# Patient Record
Sex: MALE | Race: WHITE | NOT HISPANIC OR LATINO | Employment: UNEMPLOYED | URBAN - METROPOLITAN AREA
[De-identification: names, ages, dates, MRNs, and addresses within clinical notes are randomized per-mention and may not be internally consistent; named-entity substitution may affect disease eponyms.]

---

## 2019-07-29 ENCOUNTER — HOSPITAL ENCOUNTER (EMERGENCY)
Facility: HOSPITAL | Age: 54
Discharge: HOME | End: 2019-07-29
Attending: EMERGENCY MEDICINE | Admitting: EMERGENCY MEDICINE
Payer: COMMERCIAL

## 2019-07-29 VITALS
DIASTOLIC BLOOD PRESSURE: 93 MMHG | SYSTOLIC BLOOD PRESSURE: 153 MMHG | RESPIRATION RATE: 20 BRPM | TEMPERATURE: 97.7 F | WEIGHT: 193.34 LBS | OXYGEN SATURATION: 97 % | HEART RATE: 110 BPM

## 2019-07-29 DIAGNOSIS — S01.81XA FACIAL LACERATION, INITIAL ENCOUNTER: Primary | ICD-10-CM

## 2019-07-29 DIAGNOSIS — S40.012A TRAUMATIC HEMATOMA OF LEFT SHOULDER, INITIAL ENCOUNTER: ICD-10-CM

## 2019-07-29 PROBLEM — J44.1 COPD WITH EXACERBATION (HCC): Status: ACTIVE | Noted: 2017-10-30

## 2019-07-29 PROBLEM — M54.41 CHRONIC BILATERAL LOW BACK PAIN WITH BILATERAL SCIATICA: Status: ACTIVE | Noted: 2017-03-27

## 2019-07-29 PROBLEM — G89.29 CHRONIC BILATERAL LOW BACK PAIN WITH BILATERAL SCIATICA: Status: ACTIVE | Noted: 2017-03-27

## 2019-07-29 PROBLEM — M47.812 CERVICAL SPONDYLOSIS WITHOUT MYELOPATHY: Status: ACTIVE | Noted: 2017-12-22

## 2019-07-29 PROBLEM — M54.42 CHRONIC BILATERAL LOW BACK PAIN WITH BILATERAL SCIATICA: Status: ACTIVE | Noted: 2017-03-27

## 2019-07-29 PROBLEM — F32.0 CURRENT MILD EPISODE OF MAJOR DEPRESSIVE DISORDER (HCC): Status: ACTIVE | Noted: 2019-03-18

## 2019-07-29 PROCEDURE — 99283 EMERGENCY DEPT VISIT LOW MDM: CPT

## 2019-07-29 PROCEDURE — 99283 EMERGENCY DEPT VISIT LOW MDM: CPT | Performed by: EMERGENCY MEDICINE

## 2019-07-30 NOTE — ED ATTENDING ATTESTATION
IEric 24, DO, saw and evaluated the patient  I have discussed the patient with the resident/non-physician practitioner and agree with the resident's/non-physician practitioner's findings, Plan of Care, and MDM as documented in the resident's/non-physician practitioner's note, except where noted  All available labs and Radiology studies were reviewed  I was present for key portions of any procedure(s) performed by the resident/non-physician practitioner and I was immediately available to provide assistance  At this point I agree with the current assessment done in the Emergency Department  I have conducted an independent evaluation of this patient a history and physical is as follows:    48 y o  M p/w facial lacerations x last night  Got shoe lace caught in chair and caused him to fall  Hit face  +LOC  Didn't come in earlier today because it was too hot out  Pt walked here 8 blocks from home  Denies HA, changes in vision, neck pain, N/V, focal weakness  EOM-I  PERRL  Has a healing lac to left upper eye and left cheek  Bleeding controlled  Contusion to left shoulder but with full ROM  No C/T/L-spine tenderness  Neuro intact  Up to date with Tetanus  Pt came in to see if he needs sutures  Lacerations are not gaping and are starting to scab over  Plan: Facial injury - Will apply steri strips        Critical Care Time  Procedures

## 2019-07-30 NOTE — ED PROVIDER NOTES
History  Chief Complaint   Patient presents with    Head Injury w/LOC     Pt reports tripped and fell on shoe string around 0100 this AM, reports LOC for approximately 30 seconds, no thinners, abrasion noted to L side of face, reports alochol use last night, but denies today, denies vision changes,     29-year-old male presenting emergency department for evaluation of 2 lacerations around his left orbit present for the last day  Reports that around 1:00 a m  This morning he was getting up from an office chair and shoe lace was wrapped in the wheel causing him to trip and fall forward  He hit his left side of his face against another chair next to an  Admits to losing consciousness  Says that after he woke back up he was able to clean and dress the wounds  He has been applying ice during the day  He would come in earlier but he was responsible for taking care of his elderly mother as well as reluctant to walk to the hospital in the heat today so he was waiting for the sun to go down  He also notes that he is left shoulder but denies any difficulty with strength or range of motion left shoulder  Denies any double vision, blurry vision, headaches, or other injuries  No pain with extraocular movements  Tetanus is up-to-date within the last 5 years  None       Past Medical History:   Diagnosis Date    Asthma     COPD (chronic obstructive pulmonary disease) ([facility] [address] )        History reviewed  No pertinent surgical history  History reviewed  No pertinent family history  I have reviewed and agree with the history as documented  Social History     Tobacco Use    Smoking status: Current Every Day Smoker     Packs/day: 0 50     Types: Cigarettes    Smokeless tobacco: Never Used   Substance Use Topics    Alcohol use: Yes    Drug use: Not Currently        Review of Systems   Constitutional: Negative  Negative for chills, fatigue and fever     HENT: Negative for congestion, dental problem, facial swelling, nosebleeds, sore throat, trouble swallowing and voice change  Eyes: Negative for visual disturbance  Respiratory: Negative for cough, chest tightness and shortness of breath  Cardiovascular: Negative for chest pain and palpitations  Gastrointestinal: Negative for abdominal pain, diarrhea, nausea and vomiting  Genitourinary: Negative  Negative for difficulty urinating and dysuria  Musculoskeletal: Negative for arthralgias, back pain, gait problem, joint swelling, myalgias, neck pain and neck stiffness  Skin: Positive for wound  Negative for pallor and rash  Neurological: Negative for weakness, light-headedness, numbness and headaches  Physical Exam  ED Triage Vitals [07/29/19 2046]   Temperature Pulse Respirations Blood Pressure SpO2   97 7 °F (36 5 °C) (!) 110 20 153/93 97 %      Temp Source Heart Rate Source Patient Position - Orthostatic VS BP Location FiO2 (%)   Oral Monitor Sitting Right arm --      Pain Score       6             Orthostatic Vital Signs  Vitals:    07/29/19 2046   BP: 153/93   Pulse: (!) 110   Patient Position - Orthostatic VS: Sitting       Physical Exam   Constitutional: He is oriented to person, place, and time  He appears well-developed and well-nourished  HENT:   Head: Normocephalic  Right Ear: External ear normal    Left Ear: External ear normal    Nose: Nose normal    Mouth/Throat: Oropharynx is clear and moist    Two irregularly shaped lacerations around the left orbit  Eyes: Pupils are equal, round, and reactive to light  EOM are normal    Neck: No tracheal deviation present  No neck tenderness   Cardiovascular: Normal rate, regular rhythm, normal heart sounds and intact distal pulses  Pulmonary/Chest: Effort normal and breath sounds normal  No respiratory distress  He exhibits no tenderness  Abdominal: Soft  He exhibits no distension  There is no tenderness  Musculoskeletal: Normal range of motion   He exhibits no edema, tenderness or deformity  Arms:  No midline spinal tenderness  Stable pelvis  No pain with AP or lateral compression of the chest wall  No bony tenderness to the upper or lower extremities  Neurological: He is alert and oriented to person, place, and time  No cranial nerve deficit or sensory deficit  He exhibits normal muscle tone  Coordination normal    Skin: Skin is warm and dry  Capillary refill takes less than 2 seconds  No pallor  Nursing note and vitals reviewed  ED Medications  Medications - No data to display    Diagnostic Studies  Results Reviewed     None                 No orders to display         Procedures  Procedures        ED Course                               MDM  Number of Diagnoses or Management Options  Diagnosis management comments: 81-year-old male presenting emergency department for evaluation to the left side of the face after falling 1 day ago  Denies any complaints  No signs of orbital wall fracture with entrapment  No signs of intracranial head injury  Patient's tetanus is up-to-date  I discussed the risks of suturing this long after the wound  Patient agreed to treat alternatively with loose approximation with Steri-Strips  Instructed on local wound care  Referred to primary care physician for further management if he has any concerns  Disposition  Final diagnoses:   Facial laceration, initial encounter   Traumatic hematoma of left shoulder, initial encounter     Time reflects when diagnosis was documented in both MDM as applicable and the Disposition within this note     Time User Action Codes Description Comment    7/29/2019 10:24 PM Sana Brito Add [S01 81XA] Facial laceration, initial encounter     7/29/2019 10:24 PM Bhupendra Kent Add [S40 012A] Traumatic hematoma of left shoulder, initial encounter       ED Disposition     ED Disposition Condition Date/Time Comment    Discharge Stable Mon Jul 29, 2019 10:24 PM Hipolito Villa discharge to home/self care  Follow-up Information     Follow up With Specialties Details Why Contact Info Additional [address] [facility]  [phone]  [address] [place] [address],[address], [id number]          Patient's Medications    No medications on file     No discharge procedures on file  ED Provider  Attending physically available and evaluated Napoleon Myers I managed the patient along with the ED Attending      Electronically Signed by         Zohreh Carey MD  07/29/19 7827

## 2025-02-21 ENCOUNTER — APPOINTMENT (EMERGENCY)
Dept: CT IMAGING | Facility: HOSPITAL | Age: 60
DRG: 720 | End: 2025-02-21
Payer: MEDICARE

## 2025-02-21 ENCOUNTER — HOSPITAL ENCOUNTER (INPATIENT)
Facility: HOSPITAL | Age: 60
LOS: 5 days | Discharge: HOME | DRG: 720 | End: 2025-02-26
Attending: EMERGENCY MEDICINE | Admitting: INTERNAL MEDICINE
Payer: MEDICARE

## 2025-02-21 ENCOUNTER — APPOINTMENT (EMERGENCY)
Dept: RADIOLOGY | Facility: HOSPITAL | Age: 60
DRG: 720 | End: 2025-02-21
Payer: MEDICARE

## 2025-02-21 DIAGNOSIS — N17.9 ACUTE KIDNEY INJURY (HCC): ICD-10-CM

## 2025-02-21 DIAGNOSIS — J96.00 ACUTE RESPIRATORY FAILURE (HCC): Primary | ICD-10-CM

## 2025-02-21 DIAGNOSIS — J10.1 INFLUENZA A: ICD-10-CM

## 2025-02-21 DIAGNOSIS — J18.9 RLL PNEUMONIA: ICD-10-CM

## 2025-02-21 DIAGNOSIS — I10 BENIGN ESSENTIAL HYPERTENSION: ICD-10-CM

## 2025-02-21 DIAGNOSIS — Z13.9 ENCOUNTER FOR SCREENING INVOLVING SOCIAL DETERMINANTS OF HEALTH (SDOH): ICD-10-CM

## 2025-02-21 DIAGNOSIS — J44.1 COPD EXACERBATION (HCC): ICD-10-CM

## 2025-02-21 PROBLEM — A41.9 SEPSIS WITH ACUTE ORGAN DYSFUNCTION (HCC): Status: ACTIVE | Noted: 2025-02-21

## 2025-02-21 PROBLEM — R65.20 SEPSIS WITH ACUTE ORGAN DYSFUNCTION (HCC): Status: ACTIVE | Noted: 2025-02-21

## 2025-02-21 PROBLEM — E87.1 HYPONATREMIA: Status: ACTIVE | Noted: 2025-02-21

## 2025-02-21 PROBLEM — E87.8 DISORDER OF ACID-BASE BALANCE: Status: ACTIVE | Noted: 2025-02-21

## 2025-02-21 PROBLEM — J44.9 COPD (CHRONIC OBSTRUCTIVE PULMONARY DISEASE) (HCC): Status: ACTIVE | Noted: 2017-10-30

## 2025-02-21 PROBLEM — E83.52 HYPERCALCEMIA: Status: ACTIVE | Noted: 2025-02-21

## 2025-02-21 LAB
2HR DELTA HS TROPONIN: 2 NG/L
4HR DELTA HS TROPONIN: 2 NG/L
ALBUMIN SERPL BCG-MCNC: 2.1 G/DL (ref 3.5–5)
ALP SERPL-CCNC: 22 U/L (ref 34–104)
ALT SERPL W P-5'-P-CCNC: 45 U/L (ref 7–52)
AMORPH URATE CRY URNS QL MICRO: ABNORMAL
ANION GAP SERPL CALCULATED.3IONS-SCNC: 17 MMOL/L (ref 4–13)
ANION GAP SERPL CALCULATED.3IONS-SCNC: 20 MMOL/L (ref 4–13)
ANION GAP SERPL CALCULATED.3IONS-SCNC: 24 MMOL/L (ref 4–13)
APTT PPP: 30 SECONDS (ref 23–34)
AST SERPL W P-5'-P-CCNC: 38 U/L (ref 13–39)
ATRIAL RATE: 112 BPM
ATRIAL RATE: 115 BPM
ATRIAL RATE: 133 BPM
BACTERIA UR QL AUTO: ABNORMAL /HPF
BASE EX.OXY STD BLDV CALC-SCNC: 88.8 % (ref 60–80)
BASE EXCESS BLDV CALC-SCNC: -12.9 MMOL/L
BASOPHILS # BLD AUTO: 0.07 THOUSANDS/ΜL (ref 0–0.1)
BASOPHILS NFR BLD AUTO: 1 % (ref 0–1)
BILIRUB SERPL-MCNC: 0.74 MG/DL (ref 0.2–1)
BILIRUB UR QL STRIP: NEGATIVE
BNP SERPL-MCNC: 65 PG/ML (ref 0–100)
BUN SERPL-MCNC: 107 MG/DL (ref 5–25)
BUN SERPL-MCNC: 117 MG/DL (ref 5–25)
BUN SERPL-MCNC: 122 MG/DL (ref 5–25)
CALCIUM ALBUM COR SERPL-MCNC: 11.5 MG/DL (ref 8.3–10.1)
CALCIUM SERPL-MCNC: 10 MG/DL (ref 8.4–10.2)
CALCIUM SERPL-MCNC: 9 MG/DL (ref 8.4–10.2)
CALCIUM SERPL-MCNC: 9.1 MG/DL (ref 8.4–10.2)
CARDIAC TROPONIN I PNL SERPL HS: 7 NG/L (ref ?–50)
CARDIAC TROPONIN I PNL SERPL HS: 9 NG/L (ref ?–50)
CARDIAC TROPONIN I PNL SERPL HS: 9 NG/L (ref ?–50)
CHLORIDE SERPL-SCNC: 90 MMOL/L (ref 96–108)
CHLORIDE SERPL-SCNC: 95 MMOL/L (ref 96–108)
CHLORIDE SERPL-SCNC: 95 MMOL/L (ref 96–108)
CK SERPL-CCNC: 242 U/L (ref 39–308)
CLARITY UR: ABNORMAL
CO2 SERPL-SCNC: 13 MMOL/L (ref 21–32)
CO2 SERPL-SCNC: 15 MMOL/L (ref 21–32)
CO2 SERPL-SCNC: 20 MMOL/L (ref 21–32)
COLOR UR: YELLOW
CREAT SERPL-MCNC: 4.03 MG/DL (ref 0.6–1.3)
CREAT SERPL-MCNC: 5.41 MG/DL (ref 0.6–1.3)
CREAT SERPL-MCNC: 7 MG/DL (ref 0.6–1.3)
CREAT UR-MCNC: 212.9 MG/DL
EOSINOPHIL # BLD AUTO: 0.01 THOUSAND/ΜL (ref 0–0.61)
EOSINOPHIL NFR BLD AUTO: 0 % (ref 0–6)
ERYTHROCYTE [DISTWIDTH] IN BLOOD BY AUTOMATED COUNT: 12.9 % (ref 11.6–15.1)
FLUAV RNA RESP QL NAA+PROBE: POSITIVE
FLUBV RNA RESP QL NAA+PROBE: NEGATIVE
GFR SERPL CREATININE-BSD FRML MDRD: 10 ML/MIN/1.73SQ M
GFR SERPL CREATININE-BSD FRML MDRD: 15 ML/MIN/1.73SQ M
GFR SERPL CREATININE-BSD FRML MDRD: 7 ML/MIN/1.73SQ M
GLUCOSE SERPL-MCNC: 147 MG/DL (ref 65–140)
GLUCOSE SERPL-MCNC: 181 MG/DL (ref 65–140)
GLUCOSE SERPL-MCNC: 186 MG/DL (ref 65–140)
GLUCOSE UR STRIP-MCNC: NEGATIVE MG/DL
HCO3 BLDV-SCNC: 14.6 MMOL/L (ref 24–30)
HCT VFR BLD AUTO: 41.1 % (ref 36.5–49.3)
HGB BLD-MCNC: 14.7 G/DL (ref 12–17)
HGB UR QL STRIP.AUTO: ABNORMAL
HYALINE CASTS #/AREA URNS LPF: ABNORMAL /LPF
IMM GRANULOCYTES # BLD AUTO: 0.18 THOUSAND/UL (ref 0–0.2)
IMM GRANULOCYTES NFR BLD AUTO: 2 % (ref 0–2)
INR PPP: 1.26 (ref 0.85–1.19)
KETONES UR STRIP-MCNC: NEGATIVE MG/DL
L PNEUMO1 AG UR QL IA.RAPID: NEGATIVE
LACTATE SERPL-SCNC: 2 MMOL/L (ref 0.5–2)
LACTATE SERPL-SCNC: 2.6 MMOL/L (ref 0.5–2)
LEUKOCYTE ESTERASE UR QL STRIP: NEGATIVE
LYMPHOCYTES # BLD AUTO: 0.57 THOUSANDS/ΜL (ref 0.6–4.47)
LYMPHOCYTES NFR BLD AUTO: 7 % (ref 14–44)
MCH RBC QN AUTO: 31.6 PG (ref 26.8–34.3)
MCHC RBC AUTO-ENTMCNC: 35.8 G/DL (ref 31.4–37.4)
MCV RBC AUTO: 88 FL (ref 82–98)
MONOCYTES # BLD AUTO: 1.54 THOUSAND/ΜL (ref 0.17–1.22)
MONOCYTES NFR BLD AUTO: 18 % (ref 4–12)
NEUTROPHILS # BLD AUTO: 6.16 THOUSANDS/ΜL (ref 1.85–7.62)
NEUTS SEG NFR BLD AUTO: 72 % (ref 43–75)
NITRITE UR QL STRIP: NEGATIVE
NON-SQ EPI CELLS URNS QL MICRO: ABNORMAL /HPF
NRBC BLD AUTO-RTO: 0 /100 WBCS
O2 CT BLDV-SCNC: 18.9 ML/DL
P AXIS: 82 DEGREES
P AXIS: 84 DEGREES
P AXIS: 84 DEGREES
PCO2 BLDV: 39.1 MM HG (ref 42–50)
PH BLDV: 7.19 [PH] (ref 7.3–7.4)
PH UR STRIP.AUTO: 5 [PH]
PLATELET # BLD AUTO: 339 THOUSANDS/UL (ref 149–390)
PMV BLD AUTO: 9.1 FL (ref 8.9–12.7)
PO2 BLDV: 70.5 MM HG (ref 35–45)
POTASSIUM SERPL-SCNC: 3.2 MMOL/L (ref 3.5–5.3)
POTASSIUM SERPL-SCNC: 3.4 MMOL/L (ref 3.5–5.3)
POTASSIUM SERPL-SCNC: 3.7 MMOL/L (ref 3.5–5.3)
PR INTERVAL: 130 MS
PR INTERVAL: 132 MS
PROCALCITONIN SERPL-MCNC: 5.04 NG/ML
PROT SERPL-MCNC: 8.9 G/DL (ref 6.4–8.4)
PROT UR STRIP-MCNC: ABNORMAL MG/DL
PROT UR-MCNC: 200.1 MG/DL
PROT/CREAT UR: 0.9 MG/G{CREAT} (ref 0–0.1)
PROTHROMBIN TIME: 15.9 SECONDS (ref 12.3–15)
QRS AXIS: 84 DEGREES
QRS AXIS: 85 DEGREES
QRS AXIS: 88 DEGREES
QRSD INTERVAL: 86 MS
QRSD INTERVAL: 90 MS
QRSD INTERVAL: 92 MS
QT INTERVAL: 290 MS
QT INTERVAL: 322 MS
QT INTERVAL: 336 MS
QTC INTERVAL: 431 MS
QTC INTERVAL: 445 MS
QTC INTERVAL: 458 MS
RBC # BLD AUTO: 4.65 MILLION/UL (ref 3.88–5.62)
RBC #/AREA URNS AUTO: ABNORMAL /HPF
RSV RNA RESP QL NAA+PROBE: NEGATIVE
S PNEUM AG UR QL: POSITIVE
SARS-COV-2 RNA RESP QL NAA+PROBE: NEGATIVE
SODIUM SERPL-SCNC: 127 MMOL/L (ref 135–147)
SODIUM SERPL-SCNC: 130 MMOL/L (ref 135–147)
SODIUM SERPL-SCNC: 132 MMOL/L (ref 135–147)
SP GR UR STRIP.AUTO: 1.02 (ref 1–1.03)
T WAVE AXIS: 58 DEGREES
T WAVE AXIS: 59 DEGREES
T WAVE AXIS: 71 DEGREES
UROBILINOGEN UR STRIP-ACNC: <2 MG/DL
VENTRICULAR RATE: 112 BPM
VENTRICULAR RATE: 115 BPM
VENTRICULAR RATE: 133 BPM
WBC # BLD AUTO: 8.53 THOUSAND/UL (ref 4.31–10.16)
WBC #/AREA URNS AUTO: ABNORMAL /HPF

## 2025-02-21 PROCEDURE — 99285 EMERGENCY DEPT VISIT HI MDM: CPT

## 2025-02-21 PROCEDURE — 94760 N-INVAS EAR/PLS OXIMETRY 1: CPT

## 2025-02-21 PROCEDURE — 96365 THER/PROPH/DIAG IV INF INIT: CPT

## 2025-02-21 PROCEDURE — 99254 IP/OBS CNSLTJ NEW/EST MOD 60: CPT | Performed by: INTERNAL MEDICINE

## 2025-02-21 PROCEDURE — 96368 THER/DIAG CONCURRENT INF: CPT

## 2025-02-21 PROCEDURE — 94664 DEMO&/EVAL PT USE INHALER: CPT

## 2025-02-21 PROCEDURE — 82550 ASSAY OF CK (CPK): CPT

## 2025-02-21 PROCEDURE — 93010 ELECTROCARDIOGRAM REPORT: CPT | Performed by: INTERNAL MEDICINE

## 2025-02-21 PROCEDURE — 71250 CT THORAX DX C-: CPT

## 2025-02-21 PROCEDURE — 71045 X-RAY EXAM CHEST 1 VIEW: CPT

## 2025-02-21 PROCEDURE — 36415 COLL VENOUS BLD VENIPUNCTURE: CPT | Performed by: EMERGENCY MEDICINE

## 2025-02-21 PROCEDURE — 82570 ASSAY OF URINE CREATININE: CPT

## 2025-02-21 PROCEDURE — 94640 AIRWAY INHALATION TREATMENT: CPT

## 2025-02-21 PROCEDURE — 85730 THROMBOPLASTIN TIME PARTIAL: CPT

## 2025-02-21 PROCEDURE — 84484 ASSAY OF TROPONIN QUANT: CPT | Performed by: EMERGENCY MEDICINE

## 2025-02-21 PROCEDURE — 85610 PROTHROMBIN TIME: CPT

## 2025-02-21 PROCEDURE — 94002 VENT MGMT INPAT INIT DAY: CPT

## 2025-02-21 PROCEDURE — 0241U HB NFCT DS VIR RESP RNA 4 TRGT: CPT | Performed by: EMERGENCY MEDICINE

## 2025-02-21 PROCEDURE — 84156 ASSAY OF PROTEIN URINE: CPT

## 2025-02-21 PROCEDURE — 87040 BLOOD CULTURE FOR BACTERIA: CPT | Performed by: EMERGENCY MEDICINE

## 2025-02-21 PROCEDURE — 99223 1ST HOSP IP/OBS HIGH 75: CPT | Performed by: INTERNAL MEDICINE

## 2025-02-21 PROCEDURE — 87449 NOS EACH ORGANISM AG IA: CPT

## 2025-02-21 PROCEDURE — 74176 CT ABD & PELVIS W/O CONTRAST: CPT

## 2025-02-21 PROCEDURE — 94644 CONT INHLJ TX 1ST HOUR: CPT

## 2025-02-21 PROCEDURE — 80053 COMPREHEN METABOLIC PANEL: CPT | Performed by: EMERGENCY MEDICINE

## 2025-02-21 PROCEDURE — 80048 BASIC METABOLIC PNL TOTAL CA: CPT

## 2025-02-21 PROCEDURE — 83605 ASSAY OF LACTIC ACID: CPT | Performed by: EMERGENCY MEDICINE

## 2025-02-21 PROCEDURE — 93005 ELECTROCARDIOGRAM TRACING: CPT

## 2025-02-21 PROCEDURE — 96366 THER/PROPH/DIAG IV INF ADDON: CPT

## 2025-02-21 PROCEDURE — 83880 ASSAY OF NATRIURETIC PEPTIDE: CPT | Performed by: EMERGENCY MEDICINE

## 2025-02-21 PROCEDURE — 80048 BASIC METABOLIC PNL TOTAL CA: CPT | Performed by: NURSE PRACTITIONER

## 2025-02-21 PROCEDURE — 96367 TX/PROPH/DG ADDL SEQ IV INF: CPT

## 2025-02-21 PROCEDURE — 84145 PROCALCITONIN (PCT): CPT | Performed by: EMERGENCY MEDICINE

## 2025-02-21 PROCEDURE — 99291 CRITICAL CARE FIRST HOUR: CPT | Performed by: EMERGENCY MEDICINE

## 2025-02-21 PROCEDURE — 85025 COMPLETE CBC W/AUTO DIFF WBC: CPT | Performed by: EMERGENCY MEDICINE

## 2025-02-21 PROCEDURE — 81001 URINALYSIS AUTO W/SCOPE: CPT

## 2025-02-21 PROCEDURE — 82805 BLOOD GASES W/O2 SATURATION: CPT

## 2025-02-21 RX ORDER — NICOTINE 21 MG/24HR
14 PATCH, TRANSDERMAL 24 HOURS TRANSDERMAL EVERY 24 HOURS
Status: DISCONTINUED | OUTPATIENT
Start: 2025-02-21 | End: 2025-02-26 | Stop reason: HOSPADM

## 2025-02-21 RX ORDER — ARIPIPRAZOLE 10 MG/1
5 TABLET ORAL DAILY
Status: DISCONTINUED | OUTPATIENT
Start: 2025-02-22 | End: 2025-02-26 | Stop reason: HOSPADM

## 2025-02-21 RX ORDER — ALBUTEROL SULFATE 90 UG/1
2 INHALANT RESPIRATORY (INHALATION) EVERY 4 HOURS PRN
Status: DISCONTINUED | OUTPATIENT
Start: 2025-02-21 | End: 2025-02-26 | Stop reason: HOSPADM

## 2025-02-21 RX ORDER — SODIUM CHLORIDE, SODIUM GLUCONATE, SODIUM ACETATE, POTASSIUM CHLORIDE, MAGNESIUM CHLORIDE, SODIUM PHOSPHATE, DIBASIC, AND POTASSIUM PHOSPHATE .53; .5; .37; .037; .03; .012; .00082 G/100ML; G/100ML; G/100ML; G/100ML; G/100ML; G/100ML; G/100ML
2000 INJECTION, SOLUTION INTRAVENOUS ONCE
Status: COMPLETED | OUTPATIENT
Start: 2025-02-21 | End: 2025-02-21

## 2025-02-21 RX ORDER — IPRATROPIUM BROMIDE AND ALBUTEROL SULFATE 2.5; .5 MG/3ML; MG/3ML
3 SOLUTION RESPIRATORY (INHALATION)
Status: DISCONTINUED | OUTPATIENT
Start: 2025-02-21 | End: 2025-02-21

## 2025-02-21 RX ORDER — TIOTROPIUM BROMIDE INHALATION SPRAY 3.12 UG/1
2 SPRAY, METERED RESPIRATORY (INHALATION) DAILY
COMMUNITY
Start: 2025-01-06

## 2025-02-21 RX ORDER — POTASSIUM CHLORIDE 1500 MG/1
40 TABLET, EXTENDED RELEASE ORAL ONCE
Status: COMPLETED | OUTPATIENT
Start: 2025-02-21 | End: 2025-02-21

## 2025-02-21 RX ORDER — SODIUM CHLORIDE, SODIUM GLUCONATE, SODIUM ACETATE, POTASSIUM CHLORIDE, MAGNESIUM CHLORIDE, SODIUM PHOSPHATE, DIBASIC, AND POTASSIUM PHOSPHATE .53; .5; .37; .037; .03; .012; .00082 G/100ML; G/100ML; G/100ML; G/100ML; G/100ML; G/100ML; G/100ML
75 INJECTION, SOLUTION INTRAVENOUS CONTINUOUS
Status: DISCONTINUED | OUTPATIENT
Start: 2025-02-21 | End: 2025-02-21

## 2025-02-21 RX ORDER — FLUTICASONE FUROATE AND VILANTEROL 200; 25 UG/1; UG/1
1 POWDER RESPIRATORY (INHALATION)
Status: DISCONTINUED | OUTPATIENT
Start: 2025-02-22 | End: 2025-02-26 | Stop reason: HOSPADM

## 2025-02-21 RX ORDER — PREDNISONE 20 MG/1
40 TABLET ORAL DAILY
Status: DISCONTINUED | OUTPATIENT
Start: 2025-02-22 | End: 2025-02-21

## 2025-02-21 RX ORDER — SODIUM CHLORIDE FOR INHALATION 0.9 %
12 VIAL, NEBULIZER (ML) INHALATION ONCE
Status: COMPLETED | OUTPATIENT
Start: 2025-02-21 | End: 2025-02-21

## 2025-02-21 RX ORDER — DULOXETIN HYDROCHLORIDE 60 MG/1
1 CAPSULE, DELAYED RELEASE ORAL DAILY
COMMUNITY
Start: 2025-02-04

## 2025-02-21 RX ORDER — ALBUTEROL SULFATE 2.5 MG/3ML
1 SOLUTION RESPIRATORY (INHALATION) ONCE
Status: COMPLETED | OUTPATIENT
Start: 2025-02-21 | End: 2025-02-21

## 2025-02-21 RX ORDER — POTASSIUM CHLORIDE 1500 MG/1
40 TABLET, EXTENDED RELEASE ORAL ONCE
Status: DISCONTINUED | OUTPATIENT
Start: 2025-02-21 | End: 2025-02-21

## 2025-02-21 RX ORDER — ARIPIPRAZOLE 5 MG/1
1 TABLET ORAL DAILY
COMMUNITY
Start: 2025-02-04

## 2025-02-21 RX ORDER — FLUTICASONE PROPIONATE AND SALMETEROL 50; 250 UG/1; UG/1
1 POWDER RESPIRATORY (INHALATION) 2 TIMES DAILY
COMMUNITY

## 2025-02-21 RX ORDER — LEVALBUTEROL INHALATION SOLUTION 1.25 MG/3ML
1.25 SOLUTION RESPIRATORY (INHALATION)
Status: DISCONTINUED | OUTPATIENT
Start: 2025-02-21 | End: 2025-02-24

## 2025-02-21 RX ORDER — IPRATROPIUM BROMIDE AND ALBUTEROL SULFATE .5; 3 MG/3ML; MG/3ML
1 SOLUTION RESPIRATORY (INHALATION) ONCE
Status: COMPLETED | OUTPATIENT
Start: 2025-02-21 | End: 2025-02-21

## 2025-02-21 RX ORDER — MIRTAZAPINE 15 MG/1
30 TABLET, FILM COATED ORAL EVERY EVENING
Status: DISCONTINUED | OUTPATIENT
Start: 2025-02-21 | End: 2025-02-24

## 2025-02-21 RX ORDER — DULOXETIN HYDROCHLORIDE 60 MG/1
60 CAPSULE, DELAYED RELEASE ORAL DAILY
Status: DISCONTINUED | OUTPATIENT
Start: 2025-02-22 | End: 2025-02-26 | Stop reason: HOSPADM

## 2025-02-21 RX ORDER — IBUPROFEN 600 MG/1
600 TABLET, FILM COATED ORAL EVERY 8 HOURS PRN
COMMUNITY
Start: 2025-01-28 | End: 2025-02-26

## 2025-02-21 RX ORDER — MONTELUKAST SODIUM 10 MG/1
10 TABLET ORAL DAILY
COMMUNITY
Start: 2025-01-06

## 2025-02-21 RX ORDER — GUAIFENESIN 600 MG/1
600 TABLET, EXTENDED RELEASE ORAL EVERY 12 HOURS SCHEDULED
COMMUNITY
Start: 2025-01-03

## 2025-02-21 RX ORDER — MIRTAZAPINE 30 MG/1
30 TABLET, FILM COATED ORAL EVERY EVENING
COMMUNITY
Start: 2024-12-08

## 2025-02-21 RX ORDER — ALBUTEROL SULFATE 5 MG/ML
10 SOLUTION RESPIRATORY (INHALATION) ONCE
Status: COMPLETED | OUTPATIENT
Start: 2025-02-21 | End: 2025-02-21

## 2025-02-21 RX ORDER — METHYLPREDNISOLONE SOD SUCC 125 MG
1 VIAL (EA) INJECTION ONCE
Status: COMPLETED | OUTPATIENT
Start: 2025-02-21 | End: 2025-02-21

## 2025-02-21 RX ORDER — LORATADINE 10 MG/1
10 TABLET ORAL DAILY
COMMUNITY
Start: 2025-01-06

## 2025-02-21 RX ORDER — SODIUM CHLORIDE, SODIUM GLUCONATE, SODIUM ACETATE, POTASSIUM CHLORIDE, MAGNESIUM CHLORIDE, SODIUM PHOSPHATE, DIBASIC, AND POTASSIUM PHOSPHATE .53; .5; .37; .037; .03; .012; .00082 G/100ML; G/100ML; G/100ML; G/100ML; G/100ML; G/100ML; G/100ML
1000 INJECTION, SOLUTION INTRAVENOUS ONCE
Status: COMPLETED | OUTPATIENT
Start: 2025-02-21 | End: 2025-02-21

## 2025-02-21 RX ORDER — METHYLPREDNISOLONE SODIUM SUCCINATE 40 MG/ML
40 INJECTION, POWDER, LYOPHILIZED, FOR SOLUTION INTRAMUSCULAR; INTRAVENOUS EVERY 8 HOURS SCHEDULED
Status: DISCONTINUED | OUTPATIENT
Start: 2025-02-21 | End: 2025-02-22

## 2025-02-21 RX ORDER — ALBUTEROL SULFATE 90 UG/1
2 INHALANT RESPIRATORY (INHALATION) EVERY 4 HOURS PRN
COMMUNITY

## 2025-02-21 RX ORDER — LISINOPRIL AND HYDROCHLOROTHIAZIDE 20; 25 MG/1; MG/1
1 TABLET ORAL DAILY
COMMUNITY
Start: 2024-12-09 | End: 2025-02-26

## 2025-02-21 RX ORDER — AZITHROMYCIN 250 MG/1
500 TABLET, FILM COATED ORAL EVERY 24 HOURS
Status: DISCONTINUED | OUTPATIENT
Start: 2025-02-22 | End: 2025-02-22

## 2025-02-21 RX ORDER — IPRATROPIUM BROMIDE 17 UG/1
2 AEROSOL, METERED RESPIRATORY (INHALATION) 2 TIMES DAILY
COMMUNITY
Start: 2024-12-08 | End: 2025-02-26

## 2025-02-21 RX ORDER — FLUTICASONE PROPIONATE 50 MCG
2 SPRAY, SUSPENSION (ML) NASAL DAILY
COMMUNITY

## 2025-02-21 RX ADMIN — IPRATROPIUM BROMIDE AND ALBUTEROL SULFATE 3 ML: .5; 3 SOLUTION RESPIRATORY (INHALATION) at 13:25

## 2025-02-21 RX ADMIN — METHYLPREDNISOLONE SODIUM SUCCINATE 40 MG: 40 INJECTION, POWDER, FOR SOLUTION INTRAMUSCULAR; INTRAVENOUS at 16:33

## 2025-02-21 RX ADMIN — IPRATROPIUM BROMIDE 1 MG: 0.5 SOLUTION RESPIRATORY (INHALATION) at 09:01

## 2025-02-21 RX ADMIN — SODIUM CHLORIDE, SODIUM GLUCONATE, SODIUM ACETATE, POTASSIUM CHLORIDE, MAGNESIUM CHLORIDE, SODIUM PHOSPHATE, DIBASIC, AND POTASSIUM PHOSPHATE 2000 ML: .53; .5; .37; .037; .03; .012; .00082 INJECTION, SOLUTION INTRAVENOUS at 09:36

## 2025-02-21 RX ADMIN — METHYLPREDNISOLONE SODIUM SUCCINATE 40 MG: 40 INJECTION, POWDER, FOR SOLUTION INTRAMUSCULAR; INTRAVENOUS at 22:27

## 2025-02-21 RX ADMIN — POTASSIUM CHLORIDE 40 MEQ: 1500 TABLET, EXTENDED RELEASE ORAL at 18:15

## 2025-02-21 RX ADMIN — SODIUM CHLORIDE, SODIUM GLUCONATE, SODIUM ACETATE, POTASSIUM CHLORIDE, MAGNESIUM CHLORIDE, SODIUM PHOSPHATE, DIBASIC, AND POTASSIUM PHOSPHATE 75 ML/HR: .53; .5; .37; .037; .03; .012; .00082 INJECTION, SOLUTION INTRAVENOUS at 13:25

## 2025-02-21 RX ADMIN — LEVALBUTEROL HYDROCHLORIDE 1.25 MG: 1.25 SOLUTION RESPIRATORY (INHALATION) at 17:11

## 2025-02-21 RX ADMIN — ALBUTEROL SULFATE 10 MG: 2.5 SOLUTION RESPIRATORY (INHALATION) at 09:01

## 2025-02-21 RX ADMIN — SODIUM CHLORIDE, SODIUM GLUCONATE, SODIUM ACETATE, POTASSIUM CHLORIDE, MAGNESIUM CHLORIDE, SODIUM PHOSPHATE, DIBASIC, AND POTASSIUM PHOSPHATE 1000 ML: .53; .5; .37; .037; .03; .012; .00082 INJECTION, SOLUTION INTRAVENOUS at 09:18

## 2025-02-21 RX ADMIN — MIRTAZAPINE 30 MG: 15 TABLET, FILM COATED ORAL at 22:28

## 2025-02-21 RX ADMIN — POTASSIUM CHLORIDE 40 MEQ: 1500 TABLET, EXTENDED RELEASE ORAL at 16:50

## 2025-02-21 RX ADMIN — ISODIUM CHLORIDE 12 ML: 0.03 SOLUTION RESPIRATORY (INHALATION) at 09:01

## 2025-02-21 RX ADMIN — CEFTRIAXONE 2000 MG: 10 INJECTION, POWDER, FOR SOLUTION INTRAVENOUS at 10:13

## 2025-02-21 RX ADMIN — SODIUM BICARBONATE 100 ML/HR: 84 INJECTION, SOLUTION INTRAVENOUS at 16:34

## 2025-02-21 RX ADMIN — AZITHROMYCIN MONOHYDRATE 500 MG: 500 INJECTION, POWDER, LYOPHILIZED, FOR SOLUTION INTRAVENOUS at 11:30

## 2025-02-21 NOTE — ASSESSMENT & PLAN NOTE
Na 127  Suspect could be in the setting of dehydration and hydrochlorothiazide  Currently asymptomatic      Plan:  Correction rate at 5-6 mEq/L per day   Continue monitoring labs

## 2025-02-21 NOTE — ASSESSMENT & PLAN NOTE
CT of the chest paraseptal and centrilobular emphysema.  Right basilar consolidation..  Changes suggestive of atypical pneumonia.  Positive for influenza A.

## 2025-02-21 NOTE — ASSESSMENT & PLAN NOTE
Sodium level 127 on admission  Likely prerenal.  Also thiazide diuretic contributing  Recheck sodium level at 2 PM

## 2025-02-21 NOTE — ASSESSMENT & PLAN NOTE
Currently controlled and asymptomatic   Home meds: Abilify 5 mg, Cymbalta 60 mg and Remeron 30 mg at bedtime     Plan:  Hold off home meds, and restart them tomorrow

## 2025-02-21 NOTE — ED PROVIDER NOTES
Time reflects when diagnosis was documented in both MDM as applicable and the Disposition within this note       Time User Action Codes Description Comment    2/21/2025  9:57 AM Junito Daniels [J96.00] Acute respiratory failure (HCC)     2/21/2025  9:58 AM Junito Daniels [N17.9] Acute kidney injury (HCC)     2/21/2025 11:51 AM Junito Daniels Add [J18.9] RLL pneumonia     2/21/2025 12:14 PM Junito Daniels [J10.1] Influenza A     2/21/2025  1:24 PM Kathy Beebe Add [Z13.9] Encounter for screening involving social determinants of health (SDoH)           ED Disposition       ED Disposition   Admit    Condition   Stable    Date/Time   Fri Feb 21, 2025 12:13 PM    Comment   Case was discussed with Critical Care/BRIAN Bryant and the patient's admission status was agreed to be Admission Status: inpatient status to the service of Dr. Casas.               Assessment & Plan       Medical Decision Making  60-year-old male presents to the ED for evaluation of SOB, ongoing approximately 5 days, worsening, does have history of COPD and asthma.  Patient initially found in respiratory distress, BiPAP started in the ED.  CT chest abdomen pelvis concerning for pneumonia, patient given 2 g IV Rocephin, IV azithromycin in ED along with 30 mL/kg IV fluid bolus.  Patient also found to have severe ADIEL, GFR of 7 with creatinine of 7, GFR 85-90 baseline per chart review.  Does report NSAID use daily, initially denied vomiting or diarrhea though did admit to nephrology that he has had multiple days of diarrhea.  Possible prerenal/NSAID component.  Nephrology consulted. Discussed case with critical care who was agreeable to admission to their service.  Please see ED course for additional details.      Critical Care Time Statement: Upon my evaluation, this patient had a high probability of imminent or life-threatening deterioration due to Respiratory failure, Acute renal failure which required my direct attention,  intervention, and personal management.  I spent a total of 45 minutes directly providing critical care services, including interpretation of complex medical databases, evaluating for the presence of life-threatening injuries or illnesses, management of organ system failure(s) , complex medical decision making (to support/prevent further life-threatening deterioration)., interpretation of hemodynamic data, and management of BiPAP. This time is exclusive of procedures, teaching, treating other patients, family meetings, and any prior time recorded by providers other than myself.       Amount and/or Complexity of Data Reviewed  Labs: ordered. Decision-making details documented in ED Course.  Radiology: ordered. Decision-making details documented in ED Course.    Risk  Prescription drug management.  Decision regarding hospitalization.        ED Course as of 02/21/25 1410   Fri Feb 21, 2025   0944 XR chest 1 view portable  IMPRESSION:     Mild congestion suggested..        1004 GFR, Calculated: 7  GFR 90 nine months ago per chart review.  Urinalysis ordered, bladder scan, ibarra catheter, CT abdomen and pelvis ordered.  Spoke with nephrology via secure chat.  Nephrology advised add CK, protein/creatinine ratio. Nephrology consult placed.     1038 INFLU A PCR(!): Positive   1058 Patient did initially report improvement in breathing, did want a trial off BiPAP.  Respiratory did attempt to trial patient off of BiPAP, immediately had increased work of breathing and tachypnea was off BiPAP less than 5 minutes.  Patient is back on BiPAP now, WOB improved on BiPAP.   1121 CT chest abdomen pelvis wo contrast  IMPRESSION:     Right basilar consolidation and advanced diffuse pulmonary centrilobular and groundglass opacities indicate pneumonia. Atypical pneumonia can have this appearance.     No acute findings in the abdomen or pelvis.     1126 Critical care paged.   1213 Discussed case with Critical Care AP, critical care will  admit patient.         Medications   ceftriaxone (ROCEPHIN) 2 g/50 mL in dextrose IVPB (has no administration in time range)   azithromycin (ZITHROMAX) tablet 500 mg (has no administration in time range)   predniSONE tablet 40 mg (has no administration in time range)   ipratropium-albuterol (DUO-NEB) 0.5-2.5 mg/3 mL inhalation solution 3 mL (has no administration in time range)   multi-electrolyte (PLASMALYTE-A/ISOLYTE-S PH 7.4) IV solution (has no administration in time range)   albuterol (FOR EMS ONLY) (2.5 mg/3 mL) 0.083 % inhalation solution 2.5 mg (0 mg Does not apply Given to EMS 2/21/25 0848)   ipratropium-albuterol (FOR EMS ONLY) (DUO-NEB) 0.5-2.5 mg/3 mL inhalation solution 3 mL (0 mL Does not apply Given to EMS 2/21/25 0848)   methylPREDNISolone sodium succinate (FOR EMS ONLY) (Solu-MEDROL) 125 MG injection 125 mg (0 mg Does not apply Given to EMS 2/21/25 0848)   albuterol inhalation solution 10 mg (10 mg Nebulization Given 2/21/25 0901)   ipratropium (ATROVENT) 0.02 % inhalation solution 1 mg (1 mg Nebulization Given 2/21/25 0901)   sodium chloride 0.9 % inhalation solution 12 mL (12 mL Nebulization Given 2/21/25 0901)   multi-electrolyte (ISOLYTE-S PH 7.4) bolus 1,000 mL (0 mL Intravenous Stopped 2/21/25 1105)   multi-electrolyte (ISOLYTE-S PH 7.4) bolus 2,000 mL (0 mL Intravenous Stopped 2/21/25 1118)   ceftriaxone (ROCEPHIN) 2 g/50 mL in dextrose IVPB (0 mg Intravenous Stopped 2/21/25 1043)   azithromycin (ZITHROMAX) 500 mg in sodium chloride 0.9 % 250 mL IVPB (0 mg Intravenous Stopped 2/21/25 1230)       ED Risk Strat Scores                            SBIRT 22yo+      Flowsheet Row Most Recent Value   SILVIO: How many times in the past year have you...    Used an illegal drug or used a prescription medication for non-medical reasons? Never Filed at: 02/21/2025 0964                            History of Present Illness       Chief Complaint   Patient presents with    Breathing Difficulty     Began 4-5  days ago, now with productive cough. (+) sick contact       Past Medical History:   Diagnosis Date    Asthma     COPD (chronic obstructive pulmonary disease) (HCC)       History reviewed. No pertinent surgical history.   History reviewed. No pertinent family history.   Social History     Tobacco Use    Smoking status: Every Day     Current packs/day: 0.50     Types: Cigarettes    Smokeless tobacco: Never   Substance Use Topics    Alcohol use: Yes    Drug use: Not Currently      E-Cigarette/Vaping      E-Cigarette/Vaping Substances      I have reviewed and agree with the history as documented.     This is a 60-year-old male with medical history significant for asthma, COPD who presents to the ED via EMS in respiratory distress.  Patient initially with increased work of breathing and tachypnea, neb treatment running by EMS, 125 mg Solu-Medrol given by EMS.  Patient does report approximately 5 days of increased difficulty breathing, also reports productive cough at home.  He does report his roommate has been sick.  He does complain of some mild chest tightness, denies any chest pain.  Does admit to chills at home, denies any fevers.  He denies any fevers, headaches, vision changes, neck pain, abdominal pain, nausea, vomiting, diarrhea, flank pain, dysuria.  Denies any pain or swelling in either leg, denies any known history of blood clots, denies any recent prolonged travel.  States has never been hospitalized for COPD before, has never had BiPAP prior to today.          Review of Systems   Constitutional:  Positive for chills. Negative for fever.   Eyes:  Negative for photophobia and visual disturbance.   Respiratory:  Positive for cough, chest tightness and shortness of breath.    Cardiovascular:  Negative for chest pain.   Gastrointestinal:  Negative for abdominal pain, diarrhea, nausea and vomiting.   Genitourinary:  Negative for difficulty urinating, dysuria and flank pain.   Musculoskeletal:  Negative for neck  pain and neck stiffness.   Neurological:  Positive for light-headedness. Negative for dizziness, syncope and headaches.           Objective       ED Triage Vitals   Temperature Pulse Blood Pressure Respirations SpO2 Patient Position - Orthostatic VS   02/21/25 0913 02/21/25 0851 02/21/25 0851 02/21/25 0851 02/21/25 0844 02/21/25 0851   97.6 °F (36.4 °C) (!) 138 109/89 (!) 40 (!) 81 % Lying      Temp Source Heart Rate Source BP Location FiO2 (%) Pain Score    02/21/25 0913 02/21/25 0945 02/21/25 0851 -- --    Axillary Monitor Right arm        Vitals      Date and Time Temp Pulse SpO2 Resp BP Pain Score FACES Pain Rating User   02/21/25 1300 -- 110 98 % 25 109/72 -- -- NG   02/21/25 1230 -- 108 99 % 22 115/82 -- -- NG   02/21/25 1215 -- 108 100 % 19 120/76 -- -- NG   02/21/25 1200 -- 110 99 % 20 115/75 -- -- NG   02/21/25 1145 -- 110 100 % 23 126/79 -- -- NG   02/21/25 1130 -- 112 100 % 18 121/72 -- -- NG   02/21/25 1115 -- 112 100 % 26 112/88 -- -- NG   02/21/25 1100 -- 114 99 % 24 136/66 -- -- NG   02/21/25 1045 -- 114 99 % 21 136/66 -- -- NG   02/21/25 1030 -- 116 98 % 23 140/68 -- -- NG   02/21/25 1015 -- 118 99 % 21 101/73 -- -- NG   02/21/25 1000 -- 120 100 % 26 122/72 -- -- NG   02/21/25 0945 -- 124 98 % 23 113/68 -- -- NG   02/21/25 0930 -- 126 98 % 24 97/63 -- -- NG   02/21/25 0920 -- -- -- -- 78/64 Junito-PA aware of pt's B/P. -- -- NG   02/21/25 0913 97.6 °F (36.4 °C) 128 98 % 24 97/67 -- -- NG   02/21/25 0902 -- -- 99 % -- -- -- -- AP   02/21/25 0851 -- 138 96 % 40 109/89 -- -- NG   02/21/25 0844 -- -- 81 % Upon EMS arrival to pt -- -- -- -- NG            Physical Exam  Vitals and nursing note reviewed.   Constitutional:       General: He is in acute distress.      Appearance: He is well-developed.   HENT:      Head: Normocephalic and atraumatic.      Mouth/Throat:      Mouth: Mucous membranes are dry.   Cardiovascular:      Rate and Rhythm: Normal rate and regular rhythm.      Heart sounds: No murmur  heard.  Pulmonary:      Effort: Tachypnea, accessory muscle usage and respiratory distress present.      Breath sounds: Decreased air movement present. Decreased breath sounds present.   Abdominal:      General: There is no distension.      Palpations: Abdomen is soft.      Tenderness: There is no abdominal tenderness. There is no right CVA tenderness or left CVA tenderness.   Musculoskeletal:         General: No swelling.      Cervical back: Normal range of motion and neck supple. No rigidity.      Right lower leg: No edema.      Left lower leg: No edema.   Skin:     General: Skin is warm and dry.      Capillary Refill: Capillary refill takes less than 2 seconds.   Neurological:      General: No focal deficit present.      Mental Status: He is alert and oriented to person, place, and time.   Psychiatric:         Mood and Affect: Mood normal.         Results Reviewed       Procedure Component Value Units Date/Time    HS Troponin I 4hr [[id number]]  (Normal) Collected: 02/21/25 1255    Lab Status: Final result Specimen: Blood from Arm, Right Updated: 02/21/25 1323     hs TnI 4hr 9 ng/L      Delta 4hr hsTnI 2 ng/L     Strep Pneumoniae, Urine [[id number]]     Lab Status: No result Specimen: Urine     Legionella antigen, urine [[id number]]     Lab Status: No result Specimen: Urine, Catheter     Lactic acid 2 Hours [[id number]]  (Normal) Collected: 02/21/25 1130    Lab Status: Final result Specimen: Blood from Arm, Right Updated: 02/21/25 1156     LACTIC ACID 2.0 mmol/L     Narrative:      Result may be elevated if tourniquet was used during collection.    HS Troponin I 2hr [[id number]]  (Normal) Collected: 02/21/25 1059    Lab Status: Final result Specimen: Blood from Arm, Right Updated: 02/21/25 1129     hs TnI 2hr 9 ng/L      Delta 2hr hsTnI 2 ng/L     Urine Microscopic [[id number]]  (Abnormal) Collected: 02/21/25 1007    Lab Status: Final result Specimen: Urine, Clean Catch Updated: 02/21/25 1115     RBC, UA 1-2 /hpf       WBC, UA 2-4 /hpf      Epithelial Cells Occasional /hpf      Bacteria, UA None Seen /hpf      Hyaline Casts, UA 3-5 /lpf      Amorphous Crystals, UA Occasional    CK [[id number]]  (Normal) Collected: 02/21/25 0855    Lab Status: Final result Specimen: Blood from Arm, Right Updated: 02/21/25 1113     Total  U/L     Protein / creatinine ratio, urine [[id number]]  (Abnormal) Collected: 02/21/25 1007    Lab Status: Final result Specimen: Urine, Clean Catch Updated: 02/21/25 1113     Creatinine, Ur 212.9 mg/dL      Protein Urine Random 200.1 mg/dL      Prot/Creat Ratio, Ur 0.9    UA w Reflex to Microscopic w Reflex to Culture [[id number]]  (Abnormal) Collected: 02/21/25 1007    Lab Status: Final result Specimen: Urine, Clean Catch Updated: 02/21/25 1107     Color, UA Yellow     Clarity, UA Turbid     Specific Gravity, UA 1.016     pH, UA 5.0     Leukocytes, UA Negative     Nitrite, UA Negative     Protein, UA 70 (1+) mg/dl      Glucose, UA Negative mg/dl      Ketones, UA Negative mg/dl      Urobilinogen, UA <2.0 mg/dl      Bilirubin, UA Negative     Occult Blood, UA Small    FLU/RSV/COVID - if FLU/RSV clinically relevant (2hr TAT) [[id number]]  (Abnormal) Collected: 02/21/25 0857    Lab Status: Final result Specimen: Nares from Nasopharyngeal Swab Updated: 02/21/25 1032     SARS-CoV-2 Negative     INFLUENZA A PCR Positive     INFLUENZA B PCR Negative     RSV PCR Negative    Narrative:      This test has been performed using the CoV-2/Flu/RSV plus assay on the [employer] GeneXpert platform. This test has been validated by the  and verified by the performing laboratory.     This test is designed to amplify and detect the following: nucleocapsid (N), envelope (E), and RNA-dependent RNA polymerase (RdRP) genes of the SARS-CoV-2 genome; matrix (M), basic polymerase (PB2), and acidic protein (PA) segments of the influenza A genome; matrix (M) and non-structural protein (NS) segments of the influenza B genome, and the  nucleocapsid genes of RSV A and RSV B.     Positive results are indicative of the presence of Flu A, Flu B, RSV, and/or SARS-CoV-2 RNA. Positive results for SARS-CoV-2 or suspected novel influenza should be reported to state, local, or federal health departments according to local reporting requirements.      All results should be assessed in conjunction with clinical presentation and other laboratory markers for clinical management.     FOR PEDIATRIC PATIENTS - copy/paste COVID Guidelines URL to browser: [email or web address]       Protime-INR [[id number]]  (Abnormal) Collected: 02/21/25 0855    Lab Status: Final result Specimen: Blood from Arm, Right Updated: 02/21/25 0940     Protime 15.9 seconds      INR 1.26    Narrative:      INR Therapeutic Range    Indication                                             INR Range      Atrial Fibrillation                                               2.0-3.0  Hypercoagulable State                                    2.0.2.3  Left Ventricular Asist Device                            2.0-3.0  Mechanical Heart Valve                                  -    Aortic(with afib, MI, embolism, HF, LA enlargement,    and/or coagulopathy)                                     2.0-3.0 (2.5-3.5)     Mitral                                                             2.5-3.5  Prosthetic/Bioprosthetic Heart Valve               2.0-3.0  Venous thromboembolism (VTE: VT, PE        2.0-3.0    APTT [[id number]]  (Normal) Collected: 02/21/25 0855    Lab Status: Final result Specimen: Blood from Arm, Right Updated: 02/21/25 0940     PTT 30 seconds     Procalcitonin [[id number]]  (Abnormal) Collected: 02/21/25 0855    Lab Status: Final result Specimen: Blood from Arm, Right Updated: 02/21/25 0937     Procalcitonin 5.04 ng/ml     B-Type Natriuretic Peptide(BNP) [[id number]]  (Normal) Collected: 02/21/25 0855    Lab Status: Final result Specimen: Blood from  Arm, Right Updated: 02/21/25 0933     BNP 65 pg/mL     HS Troponin 0hr (reflex protocol) [[id number]]  (Normal) Collected: 02/21/25 0855    Lab Status: Final result Specimen: Blood from Arm, Right Updated: 02/21/25 0933     hs TnI 0hr 7 ng/L     Comprehensive metabolic panel [[id number]]  (Abnormal) Collected: 02/21/25 0855    Lab Status: Final result Specimen: Blood from Arm, Right Updated: 02/21/25 0927     Sodium 127 mmol/L      Potassium 3.7 mmol/L      Chloride 90 mmol/L      CO2 13 mmol/L      ANION GAP 24 mmol/L       mg/dL      Creatinine 7.00 mg/dL      Glucose 147 mg/dL      Calcium 10.0 mg/dL      Corrected Calcium 11.5 mg/dL      AST 38 U/L      ALT 45 U/L      Alkaline Phosphatase 22 U/L      Total Protein 8.9 g/dL      Albumin 2.1 g/dL      Total Bilirubin 0.74 mg/dL      eGFR 7 ml/min/1.73sq m     Narrative:      National Kid[employer] Disease Foundation guidelines for Chronic Kidney Disease (CKD):     Stage 1 with normal or high GFR (GFR > 90 mL/min/1.73 square meters)    Stage 2 Mild CKD (GFR = 60-89 mL/min/1.73 square meters)    Stage 3A Moderate CKD (GFR = 45-59 mL/min/1.73 square meters)    Stage 3B Moderate CKD (GFR = 30-44 mL/min/1.73 square meters)    Stage 4 Severe CKD (GFR = 15-29 mL/min/1.73 square meters)    Stage 5 End Stage CKD (GFR <15 mL/min/1.73 square meters)  Note: GFR calculation is accurate only with a steady state creatinine    Lactic acid, plasma (w/reflex if result > 2.0) [[id number]]  (Abnormal) Collected: 02/21/25 0855    Lab Status: Final result Specimen: Blood from Arm, Right Updated: 02/21/25 0926     LACTIC ACID 2.6 mmol/L     Narrative:      Result may be elevated if tourniquet was used during collection.    Blood gas, venous [[id number]]  (Abnormal) Collected: 02/21/25 0855    Lab Status: Final result Specimen: Blood from Arm, Right Updated: 02/21/25 0919     pH, Ady 7.189     pCO2, Ady 39.1 mm Hg      pO2, Ady 70.5 mm Hg      HCO3, Ady 14.6 mmol/L      Base Excess, Ady  -12.9 mmol/L      O2 Content, Ady 18.9 ml/dL      O2 HGB, VENOUS 88.8 %     Blood culture #2 [[id number]] Collected: 02/21/25 0907    Lab Status: In process Specimen: Blood from Hand, Left Updated: 02/21/25 0918    CBC and differential [[id number]]  (Abnormal) Collected: 02/21/25 0856    Lab Status: Final result Specimen: Blood Updated: 02/21/25 0913     WBC 8.53 Thousand/uL      RBC 4.65 Million/uL      Hemoglobin 14.7 g/dL      Hematocrit 41.1 %      MCV 88 fL      MCH 31.6 pg      MCHC 35.8 g/dL      RDW 12.9 %      MPV 9.1 fL      Platelets 339 Thousands/uL      nRBC 0 /100 WBCs      Segmented % 72 %      Immature Grans % 2 %      Lymphocytes % 7 %      Monocytes % 18 %      Eosinophils Relative 0 %      Basophils Relative 1 %      Absolute Neutrophils 6.16 Thousands/µL      Absolute Immature Grans 0.18 Thousand/uL      Absolute Lymphocytes 0.57 Thousands/µL      Absolute Monocytes 1.54 Thousand/µL      Eosinophils Absolute 0.01 Thousand/µL      Basophils Absolute 0.07 Thousands/µL     Narrative:      This is an appended report.  These results have been appended to a previously verified report.    Blood culture #1 [[id number]] Collected: 02/21/25 0855    Lab Status: In process Specimen: Blood from Arm, Right Updated: 02/21/25 0906            CT chest abdomen pelvis wo contrast   Final Interpretation by Atif Rea MD (02/21 1112)      Right basilar consolidation and advanced diffuse pulmonary centrilobular and groundglass opacities indicate pneumonia. Atypical pneumonia can have this appearance.      No acute findings in the abdomen or pelvis.         The study was marked in EPIC for immediate notification.      Workstation performed: [id number]         XR chest 1 view portable   Final Interpretation by Jani Johnson MD (02/21 0926)      Mild congestion suggested..            Workstation performed: [id number]             ECG 12 Lead Documentation Only    Date/Time: 2/21/2025 9:02  AM    Performed by: Junito Daniels PA-C  Authorized by: Junito Daniels PA-C    Patient location:  ED  Rate:     ECG rate:  133    ECG rate assessment: tachycardic    Rhythm:     Rhythm: sinus tachycardia    Ectopy:     Ectopy: none    QRS:     QRS axis:  Normal  Conduction:     Conduction: normal    ST segments:     ST segments:  Normal  T waves:     T waves: normal        ED Medication and Procedure Management   Prior to Admission Medications   Prescriptions Last Dose Informant Patient Reported? Taking?   ARIPiprazole (ABILIFY) 5 mg tablet 2025 Morning  Yes Yes   Sig: Take 1 tablet by mouth in the morning   Advair Diskus 250-50 MCG/ACT inhaler 2025 Morning  Yes Yes   Sig: Inhale 1 puff 2 (two) times a day   Atrovent HFA 17 MCG/ACT inhaler Not Taking  Yes No   Sig: Inhale 2 puffs 2 (two) times a day   Patient not taking: Reported on 2025   DULoxetine (CYMBALTA) 60 mg delayed release capsule 2025 Morning  Yes Yes   Sig: Take 1 capsule by mouth in the morning   Mucinex 600 MG 12 hr tablet 2025 Morning  Yes Yes   Sig: Take 600 mg by mouth every 12 (twelve) hours   Spiriva Respimat 2.5 MCG/ACT AERS inhaler 2025 Morning  Yes Yes   Sig: Inhale 2 puffs daily   albuterol (PROVENTIL HFA,VENTOLIN HFA) 90 mcg/act inhaler 2025 Morning  Yes Yes   Sig: Inhale 2 puffs every 4 (four) hours as needed for wheezing   fluticasone (FLONASE) 50 mcg/act nasal spray 2025 Morning  Yes Yes   Si sprays into each nostril daily   ibuprofen (MOTRIN) 600 mg tablet 2025 Morning  Yes Yes   Sig: Take 600 mg by mouth every 8 (eight) hours as needed   lisinopril-hydrochlorothiazide (PRINZIDE,ZESTORETIC) 20-25 MG per tablet Past Month  Yes Yes   Sig: Take 1 tablet by mouth in the morning   loratadine (CLARITIN) 10 mg tablet Past Month  Yes Yes   Sig: Take 10 mg by mouth daily   mirtazapine (REMERON) 30 mg tablet 2025  Yes Yes   Sig: Take 30 mg by mouth every evening   montelukast (SINGULAIR) 10 mg  tablet Past Month  Yes Yes   Sig: Take 10 mg by mouth daily      Facility-Administered Medications: None     Current Discharge Medication List        CONTINUE these medications which have NOT CHANGED    Details   Advair Diskus 250-50 MCG/ACT inhaler Inhale 1 puff 2 (two) times a day      albuterol (PROVENTIL HFA,VENTOLIN HFA) 90 mcg/act inhaler Inhale 2 puffs every 4 (four) hours as needed for wheezing      ARIPiprazole (ABILIFY) 5 mg tablet Take 1 tablet by mouth in the morning      DULoxetine (CYMBALTA) 60 mg delayed release capsule Take 1 capsule by mouth in the morning      fluticasone (FLONASE) 50 mcg/act nasal spray 2 sprays into each nostril daily      ibuprofen (MOTRIN) 600 mg tablet Take 600 mg by mouth every 8 (eight) hours as needed      lisinopril-hydrochlorothiazide (PRINZIDE,ZESTORETIC) 20-25 MG per tablet Take 1 tablet by mouth in the morning      loratadine (CLARITIN) 10 mg tablet Take 10 mg by mouth daily      mirtazapine (REMERON) 30 mg tablet Take 30 mg by mouth every evening      montelukast (SINGULAIR) 10 mg tablet Take 10 mg by mouth daily      Mucinex 600 MG 12 hr tablet Take 600 mg by mouth every 12 (twelve) hours      Spiriva Respimat 2.5 MCG/ACT AERS inhaler Inhale 2 puffs daily      Atrovent HFA 17 MCG/ACT inhaler Inhale 2 puffs 2 (two) times a day           No discharge procedures on file.  ED SEPSIS DOCUMENTATION   Time reflects when diagnosis was documented in both MDM as applicable and the Disposition within this note       Time User Action Codes Description Comment    2/21/2025  9:57 AM Junito Daniels [J96.00] Acute respiratory failure (HCC)     2/21/2025  9:58 AM Junito Daniels [N17.9] Acute kidney injury (HCC)     2/21/2025 11:51 AM Junito Daniels [J18.9] RLL pneumonia     2/21/2025 12:14 PM Junito Daniels [J10.1] Influenza A     2/21/2025  1:24 PM Kathy Beebe Add [Z13.9] Encounter for screening involving social determinants of health (SDoH)            Initial Sepsis  "Screening       Row Name 02/21/25 1121 02/21/25 0900             Is the patient's history suggestive of a new or worsening infection? Yes (Proceed)  -DA Yes (Proceed)  -DA       Suspected source of infection pneumonia  -DA pneumonia  -DA       Indicate SIRS criteria Tachycardia > 90 bpm;Tachypnea > 20 resp per min  -DA Tachycardia > 90 bpm;Tachypnea > 20 resp per min  -DA       Are two or more of the above signs & symptoms of infection both present and new to the patient? Yes (Proceed)  -DA Yes (Proceed)  -DA       Assess for evidence of organ dysfunction: Are any of the below criteria present within 6 hours of suspected infection and SIRS criteria that are NOT considered to be chronic conditions? SBP < 90;Creatinine > 2.0 AND > 0.5 above baseline;Lactate > 2.0  -DA SBP < 90  -DA       Date of presentation of severe sepsis -- --       Time of presentation of severe sepsis -- --       Date of presentation of septic shock 02/21/25  -DA --       Time of presentation of septic shock 1121  -DA --       Fluid Resuscitation: 30 ml/kg IV fluid bolus will be given based on actual body weight  -DA --       Is the patient is persistently hypotensive in the hour after fluid bolus administration? If yes, patient meets criteria for vasopressor use. NO  -DA --       Sepsis Note: Click \"NEXT\" below (NOT \"close\") to generate sepsis note based on above information. YES (proceed by clicking \"NEXT\")  -DA --                 User Key  (r) = Recorded By, (t) = Taken By, (c) = Cosigned By      Initials Name Provider Type    TANIA Daniels PA-C Physician Assistant                       Junito Daniels PA-C  02/21/25 1411    "

## 2025-02-21 NOTE — ASSESSMENT & PLAN NOTE
VBG: pH 7.189.  On BiPAP  Chest x-ray: Prominent vascular interstitial markings  CT without contrast: Right basilar consolidation.  Diffuse irregular circumferential bronchial thickening most prominent in the lower lobes.  Advanced centrilobular and groundglass opacities throughout both lungs.   Xeljaquelinez Pregnancy And Lactation Text: This medication is Pregnancy Category D and is not considered safe during pregnancy.  The risk during breast feeding is also uncertain.

## 2025-02-21 NOTE — ASSESSMENT & PLAN NOTE
Low suspicion for acute exacerbation   No PFT on records   CT of chest 2/21/25: Right basilar consolidation and advanced diffuse pulmonary centrilobular and groundglass opacities indicate pneumonia. Atypical pneumonia can have this appearance   Home meds: Albuterol 2 puffs every 4 hours as needed, Advair 250-50 1 puff twice daily, Spiriva 2.5 mcg 1 puff twice daily, montelukast 10 mg daily and Flonase 50 mg daily  S/p in the ED azithromycin 500 mg IV , ceftriaxone 2 g and Solu-Medrol 125 mg by EMS    Plan:  Will continue azithromycin 500 mg p.o. x 4 more days to coverage possible atypical pneumonia  Continue home breathing treatment  Respiratory protocol

## 2025-02-21 NOTE — CONSULTS
NEPHROLOGY HOSPITAL CONSULTATION   Luis Antonio Quiñones 60 y.o. male MRN: [id number]  Unit/Bed#: ED-19 Encounter: [id number]    Brief History of Admission -60-year-old male who presents with respiratory symptoms such as wheezing and shortness of breath.  He also reports that he has had intermittent fever.  He had approximately 3 to 4 days of diarrhea.  Having dizzy spells prior to hospitalization.  Daily use of NSAIDs.  Taking his antihypertensive medication as prescribed up until hospitalization.  Baseline creatinine appears to be near 1 mg/dL.  Creatinine 7 on admission prompting nephrology consult.    Assessment & Plan  Acute kidney injury (HCC)  Etiology: Strongly suggestive of prerenal which is advanced to ATN in the setting of hypotension/decreased intake/losses due to diarrhea/diuretic/ACE inhibitor vasomotor alterations/acute infection/daily NSAID use.  Patient reports dizziness at home intermittent low blood pressures on admission.  As of May 2024 baseline creatinine near 1 mg/dL  Admitted on 2/21/2025 creatinine 7.0 mg/dL  Workup: CT without contrast: No hydronephrosis.  Tinajero catheter in place.  Making urine.  Urinalysis: Small blood, 1+ protein, 1-2 RBCs, 2-4 WBCs, 3-5 hyaline casts.  UPCR 0.9.  Not at steady state.  Will need to be repeated.  Status post 3 L of Plasma-Lyte in the emergency room  Recommendations:  Continue Tinajero catheter, strict monitoring of intake and output  Continue IV fluids.  Currently on Plasma-Lyte 75 mL/h.  Will check labs at 2:00.  May need to adjust IV fluids depending on the results.  Avoid hypotension  Continue to hold HCTZ/lisinopril  No NSAIDs.  Patient informed not to take NSAIDs moving forward.  Tylenol use endorsed only.  Check labs later today, 2 PM to monitor renal function closely  Labs in the a.m.  Pneumonia  CT of the chest paraseptal and centrilobular emphysema.  Right basilar consolidation..  Changes suggestive of atypical pneumonia.  Positive for influenza A.  Disorder  of acid-base balance  Elevated anion gap acidosis  Bicarbonate level 13  VBG pH 7.189  Multifactorial with acute respiratory failure, acute renal failure  Status post 3 L of Plasma-Lyte  Recheck labs at 2 PM  Acute respiratory failure (HCC)  VBG: pH 7.189.  On BiPAP  Chest x-ray: Prominent vascular interstitial markings  CT without contrast: Right basilar consolidation.  Diffuse irregular circumferential bronchial thickening most prominent in the lower lobes.  Advanced centrilobular and groundglass opacities throughout both lungs.  COPD with exacerbation (HCC)  Smokes a half a pack of cigarettes per day  Hyponatremia  Sodium level 127 on admission  Likely prerenal.  Also thiazide diuretic contributing  Recheck sodium level at 2 PM  Hypercalcemia  Corrected calcium 11.5 on admission  Strongly suspect volume depletion  Recheck calcium level at 2 PM  Benign essential hypertension  Hold Prinzide      HISTORY OF PRESENT ILLNESS:  Requesting Physician: Robb Casas*  Reason for Consult: Acute renal failure    Luis Antonio Quiñones is a 60 y.o. male with past medical history of chronic NSAID use due to back pain, hypertension since he was in his late 30s/40s, tobacco dependence smoking half a pack of cigarettes per day at this time who was admitted to [facility] after presenting with acute respiratory failure.  Influenza A positive.  Creatinine 7 on admission with a baseline closer to 1 mg/dL.  Patient reports fever, decreased intake, diarrhea, poor appetite, patient continued to take his antihypertensive medication lisinopril/HCTZ.  Daily NSAID use.  Reported dizziness at home.  Low blood pressure on admission.  On BiPAP support.  Multiple metabolic derangements on initial labs.  Mentating appropriately and adequate historian.  Tinjaero catheter placed on admission.  Making urine.  A renal consultation is requested today for assistance in the management of acute kidney injury.    PAST MEDICAL HISTORY:  Past Medical  History:   Diagnosis Date    Asthma     COPD (chronic obstructive pulmonary disease) ([facility])        PAST SURGICAL HISTORY:  History reviewed. No pertinent surgical history.    ALLERGIES:  Allergies   Allergen Reactions    Dust Mite Extract Shortness Of Breath    Molds & Smuts Shortness Of Breath    Pollen Extract Shortness Of Breath    Cat Dander Wheezing       SOCIAL HISTORY:  Social History     Substance and Sexual Activity   Alcohol Use Yes     Social History     Substance and Sexual Activity   Drug Use Not Currently     Social History     Tobacco Use   Smoking Status Every Day    Current packs/day: 0.50    Types: Cigarettes   Smokeless Tobacco Never       FAMILY HISTORY:  History reviewed. No pertinent family history.    MEDICATIONS:  No current facility-administered medications for this encounter.    Current Outpatient Medications:     Advair Diskus 250-50 MCG/ACT inhaler, Inhale 1 puff 2 (two) times a day, Disp: , Rfl:     albuterol (PROVENTIL HFA,VENTOLIN HFA) 90 mcg/act inhaler, Inhale 2 puffs every 4 (four) hours as needed for wheezing, Disp: , Rfl:     ARIPiprazole (ABILIFY) 5 mg tablet, Take 1 tablet by mouth in the morning, Disp: , Rfl:     DULoxetine (CYMBALTA) 60 mg delayed release capsule, Take 1 capsule by mouth in the morning, Disp: , Rfl:     fluticasone (FLONASE) 50 mcg/act nasal spray, 2 sprays into each nostril daily, Disp: , Rfl:     ibuprofen (MOTRIN) 600 mg tablet, Take 600 mg by mouth every 8 (eight) hours as needed, Disp: , Rfl:     lisinopril-hydrochlorothiazide (PRINZIDE,ZESTORETIC) 20-25 MG per tablet, Take 1 tablet by mouth in the morning, Disp: , Rfl:     loratadine (CLARITIN) 10 mg tablet, Take 10 mg by mouth daily, Disp: , Rfl:     mirtazapine (REMERON) 30 mg tablet, Take 30 mg by mouth every evening, Disp: , Rfl:     montelukast (SINGULAIR) 10 mg tablet, Take 10 mg by mouth daily, Disp: , Rfl:     Mucinex 600 MG 12 hr tablet, Take 600 mg by mouth every 12 (twelve) hours, Disp: , Rfl:      Spiriva Respimat 2.5 MCG/ACT AERS inhaler, Inhale 2 puffs daily, Disp: , Rfl:     Atrovent HFA 17 MCG/ACT inhaler, Inhale 2 puffs 2 (two) times a day (Patient not taking: Reported on 2/21/2025), Disp: , Rfl:     REVIEW OF SYSTEMS:  Constitutional: Sitting for fatigue.  Poor appetite.  Fevers.  HENT: Negative for postnasal drip  Eyes: Negative for visual disturbance.   Respiratory: Positive for cough, shortness of breath and wheezing  Cardiovascular: Negative for chest pain, palpitations and leg swelling.   Gastrointestinal: Positive for diarrhea.  No nausea vomiting  Genitourinary: No dysuria, hematuria.  Decreased urine output at times  Musculoskeletal: Positive for arthralgias, back pain  Skin: Negative for rash.   Neurological: Negative for focal weakness.  Reported intermittent dizziness prior to hospitalization  Hematological: Negative for easy bruising or bleeding.  Psychiatric/Behavioral: Negative for confusion  All the systems were reviewed and were negative except as documented on the HPI.    PHYSICAL EXAM:  Current Weight: Weight - Scale: 81.1 kg (178 lb 12.7 oz)  First Weight: Weight - Scale: 81.1 kg (178 lb 12.7 oz)  Vitals:    02/21/25 1145 02/21/25 1200 02/21/25 1215 02/21/25 1230   BP: 126/79 115/75 120/76 115/82   BP Location: Right arm Right arm Right arm Right arm   Pulse: (!) 110 (!) 110 (!) 108 (!) 108   Resp: (!) 23 20 19 22   Temp:       TempSrc:       SpO2: 100% 99% 100% 99%   Weight:           Intake/Output Summary (Last 24 hours) at 2/21/2025 1237  Last data filed at 2/21/2025 1118  Gross per 24 hour   Intake 2550 ml   Output 50 ml   Net 2500 ml     Physical Exam  Vitals reviewed.   Constitutional:       General: He is in acute distress.      Appearance: He is ill-appearing.   HENT:      Head: Normocephalic and atraumatic.      Nose: Nose normal.      Mouth/Throat:      Mouth: Mucous membranes are moist.   Eyes:      General: No scleral icterus.        Right eye: No discharge.          Left eye: No discharge.      Extraocular Movements: Extraocular movements intact.      Conjunctiva/sclera: Conjunctivae normal.   Neck:      Vascular: No carotid bruit.   Cardiovascular:      Rate and Rhythm: Regular rhythm. Tachycardia present.      Comments: Unable to appreciate murmur rub or gallop.  Loud wheezing throughout the chest.  Pulmonary:      Breath sounds: Examination of the right-upper field reveals wheezing. Examination of the left-upper field reveals wheezing. Examination of the right-middle field reveals wheezing. Examination of the left-middle field reveals wheezing. Examination of the right-lower field reveals wheezing. Examination of the left-lower field reveals wheezing. Wheezing and rales present.   Abdominal:      General: Bowel sounds are normal. There is no distension.      Palpations: Abdomen is soft.      Tenderness: There is no abdominal tenderness.   Musculoskeletal:         General: No tenderness or signs of injury.      Cervical back: No rigidity or tenderness.      Right lower leg: No edema.      Left lower leg: No edema.   Skin:     Capillary Refill: Capillary refill takes 2 to 3 seconds.      Coloration: Skin is not jaundiced or pale.      Findings: No erythema or rash.   Neurological:      Mental Status: He is oriented to person, place, and time.   Psychiatric:         Mood and Affect: Mood normal.         Behavior: Behavior normal.         Thought Content: Thought content normal.         Judgment: Judgment normal.           Invasive Devices:   Urethral Catheter Latex 16 Fr. (Active)   Amt returned on insertion(mL) 50 mL 02/21/25 1010   Reasons to continue Urinary Catheter  Accurate I&O assessment in critically ill patients (48 hr. max) 02/21/25 1010   Site Assessment Clean;Skin intact 02/21/25 1010   Collection Container Standard drainage bag 02/21/25 1010   Securement Method Securing device (Describe) 02/21/25 1010     Lab Results:   Results from last 7 days   Lab Units  "02/21/25  0856 02/21/25  0855   WBC Thousand/uL 8.53  --    HEMOGLOBIN g/dL 14.7  --    HEMATOCRIT % 41.1  --    PLATELETS Thousands/uL 339  --    POTASSIUM mmol/L  --  3.7   CHLORIDE mmol/L  --  90*   CO2 mmol/L  --  13*   BUN mg/dL  --  122*   CREATININE mg/dL  --  7.00*   CALCIUM mg/dL  --  10.0   ALK PHOS U/L  --  22*   ALT U/L  --  45   AST U/L  --  38     Other Studies:     Portions of the record may have been created with voice recognition software. Occasional wrong word or \"sound a like\" substitutions may have occurred due to the inherent limitations of voice recognition software. Read the chart carefully and recognize, using context, where substitutions have occurred.If you have any questions, please contact the dictating provider.   "

## 2025-02-21 NOTE — ED NOTES
NOTE: Pt received 1500 ML of ISOLYTE 2L order as instructed by Cortney.     Fariha Sims RN  02/21/25 9205

## 2025-02-21 NOTE — ASSESSMENT & PLAN NOTE
Etiology: Strongly suggestive of prerenal which is advanced to ATN in the setting of hypotension/decreased intake/losses due to diarrhea/diuretic/ACE inhibitor vasomotor alterations/acute infection/daily NSAID use.  Patient reports dizziness at home intermittent low blood pressures on admission.  As of May 2024 baseline creatinine near 1 mg/dL  Admitted on 2/21/2025 creatinine 7.0 mg/dL  Workup: CT without contrast: No hydronephrosis.  Tinajero catheter in place.  Making urine.  Urinalysis: Small blood, 1+ protein, 1-2 RBCs, 2-4 WBCs, 3-5 hyaline casts.  UPCR 0.9.  Not at steady state.  Will need to be repeated.  Status post 3 L of Plasma-Lyte in the emergency room  Recommendations:  Continue Tinajero catheter, strict monitoring of intake and output  Continue IV fluids.  Currently on Plasma-Lyte 75 mL/h.  Will check labs at 2:00.  May need to adjust IV fluids depending on the results.  Avoid hypotension  Continue to hold HCTZ/lisinopril  No NSAIDs.  Patient informed not to take NSAIDs moving forward.  Tylenol use endorsed only.  Check labs later today, 2 PM to monitor renal function closely  Labs in the a.m.

## 2025-02-21 NOTE — H&P
H&P - Critical Care/ICU   Name: Luis Antonio Quiñones 60 y.o. male I MRN: [id number]  Unit/Bed#: ICU 10 I Date of Admission: 2/21/2025   Date of Service: 2/21/2025 I Hospital Day: 0       Assessment & Plan  Acute respiratory failure (HCC)  Presented with 5 days of shortness of breath  In the setting, COPD exacerbation versus atypical pneumonia  Requiring Bipap due to persistent desaturation and metabolic acidosis     Plan:  Titrated to NC O2 2 Lts   Respiratory protocol  Breath treatment   Continue titrate to NC O2 with a goal sat >88%    Pneumonia  Presented with acute respiratory failure, requiring BiPAP  Patient having shortness of breath for the past 5 days, chills and severe cough.   Patient meeting sepsis in the setting of infection in the ED  CT chest 2/21/2025: Right basilar consolidation and advanced diffuse pulmonary centrilobular and groundglass opacities indicate pneumonia. Atypical pneumonia can have this appearance  CXR: mild congestion   CURB 65 score: 2 point      Plan:  Continue azithromycin 500 mg p.o daily x 4 days   Waiting blood cultures  Legionella and strep pneumoniae antigen  Sputum culture and gram stain   Consider de-escalation or escalate Antibiotics after cultures results     COPD (chronic obstructive pulmonary disease) (HCC)  Low suspicion for acute exacerbation   No PFT on records   CT of chest 2/21/25: Right basilar consolidation and advanced diffuse pulmonary centrilobular and groundglass opacities indicate pneumonia. Atypical pneumonia can have this appearance   Home meds: Albuterol 2 puffs every 4 hours as needed, Advair 250-50 1 puff twice daily, Spiriva 2.5 mcg 1 puff twice daily, montelukast 10 mg daily and Flonase 50 mg daily  S/p in the ED azithromycin 500 mg IV , ceftriaxone 2 g and Solu-Medrol 125 mg by EMS    Plan:  Will continue azithromycin 500 mg p.o. x 4 more days to coverage possible atypical pneumonia  Continue home breathing treatment  Respiratory protocol    Acute kidney  injury (HCC)  Suspected in the setting of dehydration, due to patient was having watery diarrhea for 3 days and not drinking water for the past few days, also patient is in ACE inhibitor and was taking NSAIDs at home  Likely prerenal cause.  Elevated creatinine 7.0 (baseline~1)  Nephrology consulted, will repeat labs ar 2 p.m.  S/p 3lt of isolate bolus in the ED    Plan:  Monitoring labs  Maintenance fluids at 75 ml/hr       Disorder of acid-base balance  Acidosis metabolic with increased anion gap and respiratory compensation   Suspect in the setting of kennedy lactic acid due to sepsis and ADIEL  Current clinically stable   Nephrology was consulted in the ED  Received 3 LT bolus of fluids in the ED    Plan:  Will repeat labs per Nephrology  Will continue monitoring and no need for dialysis at this time  Nephrology on board   Benign essential hypertension  It;s been controlled in the past  Home meds: lisinopril-hydrochlorothiazide 20-25 mg daily   Was hypotensive in the ED due to sepsis     Plan:  Hold home meds   Monitor BP   Consider labetalol of BP>180/110 mmHg and symptoms   Low sodium diet    Current mild episode of major depressive disorder (HCC)  Currently controlled and asymptomatic   Home meds: Abilify 5 mg, Cymbalta 60 mg and Remeron 30 mg at bedtime     Plan:  Hold off home meds, and restart them tomorrow   Hyponatremia  Na 127  Suspect could be in the setting of dehydration and hydrochlorothiazide  Currently asymptomatic      Plan:  Correction rate at 5-6 mEq/L per day   Continue monitoring labs   Hypercalcemia  In the setting of volume depletion.   Nephrology in on board  Sepsis with acute organ dysfunction (HCC)  Presented with hypotension, tachycardia and tachypnea  Was having fever for the 3 days ago  Lactic acid 2.6, post IV fluids 2.0  S.p 3 Lts bolus Isolyte in the ED   Suspected source: bacterial pneumonia v/s influenza A  With ADIEL due to Creatinine 7.0    Plan:  Maintenance fluids at 75 ml/hr        Disposition: Critical care    History of Present Illness   Luis Antonio Quiñones is a 60 y.o. with PMH of COPD, HTN, Hypercholesterolemia, Alcohol and nicotine dependence, who presents to the ED complaining of shortness of breath for the past 5 days and of productive cough. Initially patient started feeling weak, chest tightness, fever and watery diarrhea for 3 days, however those symptoms stopped. Subsequently his shortness of breath and cough continue worsening, then he decide to come to the hospital.    During the ED, he was presenting respiratory distress and he was putting in BiPAP. Attempt to trial him off BiPAP, after approximately 3 minutes he needed to be put right back on BiPAP due to persistent desaturation. Lactic acid was elevated, he received 3 Lt of bolus. Nephrology was consulted in the ED.    History obtained from chart review and the patient.  Review of Systems: See HPI for Review of Systems    Historical Information   Past Medical History:  No date: Asthma  No date: COPD (chronic obstructive pulmonary disease) (HCC) No past surgical history on file.   Current Outpatient Medications   Medication Instructions    Advair Diskus 250-50 MCG/ACT inhaler 1 puff, 2 times daily    albuterol (PROVENTIL HFA,VENTOLIN HFA) 90 mcg/act inhaler 2 puffs, Every 4 hours PRN    ARIPiprazole (ABILIFY) 5 mg tablet 1 tablet, Daily    Atrovent HFA 17 MCG/ACT inhaler 2 puffs, 2 times daily    DULoxetine (CYMBALTA) 60 mg delayed release capsule 1 capsule, Daily    fluticasone (FLONASE) 50 mcg/act nasal spray 2 sprays, Daily    ibuprofen (MOTRIN) 600 mg, Every 8 hours PRN    lisinopril-hydrochlorothiazide (PRINZIDE,ZESTORETIC) 20-25 MG per tablet 1 tablet, Daily    loratadine (CLARITIN) 10 mg, Daily    mirtazapine (REMERON) 30 mg, Every evening    montelukast (SINGULAIR) 10 mg, Daily    Mucinex 600 mg, Every 12 hours scheduled    Spiriva Respimat 2.5 MCG/ACT AERS inhaler 2 puffs, Daily    Allergies   Allergen Reactions    Dust  Mite Extract Shortness Of Breath    Molds & Smuts Shortness Of Breath    Pollen Extract Shortness Of Breath    Cat Dander Wheezing      Social History     Tobacco Use    Smoking status: Every Day     Current packs/day: 0.50     Types: Cigarettes    Smokeless tobacco: Never   Substance Use Topics    Alcohol use: Yes    Drug use: Not Currently    History reviewed. No pertinent family history.       Objective :                   Vitals I/O      Most Recent Min/Max in 24hrs   Temp 97.6 °F (36.4 °C) Temp  Min: 97.6 °F (36.4 °C)  Max: 97.6 °F (36.4 °C)   Pulse (!) 110 Pulse  Min: 108  Max: 138   Resp (!) 25 Resp  Min: 18  Max: 40   /72 BP  Min: 78/64  Max: 140/68   O2 Sat 98 % SpO2  Min: 81 %  Max: 100 %      Intake/Output Summary (Last 24 hours) at 2/21/2025 1428  Last data filed at 2/21/2025 1305  Gross per 24 hour   Intake 2800 ml   Output 500 ml   Net 2300 ml       Diet Cardiovascular; Sodium 2 GM; Sodium 2 GM    Invasive Monitoring           Physical Exam   Physical Exam  Vitals reviewed.   Eyes:      General: Vision grossly intact.      Extraocular Movements: Extraocular movements intact.      Pupils: Pupils are equal, round, and reactive to light.   Skin:     General: Skin is warm.      Capillary Refill: Capillary refill takes less than 2 seconds.      Findings: No rash.   HENT:      Head: Normocephalic and atraumatic.      Nose: No congestion.      Mouth/Throat:      Mouth: Mucous membranes are moist.   Cardiovascular:      Rate and Rhythm: Normal rate and regular rhythm.      Pulses: Normal pulses.   Musculoskeletal:         General: Normal range of motion.      Right lower leg: No edema.      Left lower leg: No edema.   Abdominal: General: Bowel sounds are normal.      Palpations: Abdomen is soft.      Tenderness: There is no abdominal tenderness.   Constitutional:       General: He is in acute distress.      Appearance: He is well-developed. He is ill-appearing.   Pulmonary:      Effort: Pulmonary effort  is normal. No respiratory distress.      Breath sounds: Wheezing and rhonchi present. No rales.   Neurological:      General: No focal deficit present.      Mental Status: He is alert and oriented to person, place and time.      Cranial Nerves: No dysarthria or facial asymmetry.   Genitourinary/Anorectal:  Tinajero present.        Diagnostic Studies        Lab Results: I have reviewed the following results:     Medications:  Scheduled PRN   [START ON 2/22/2025] azithromycin, 500 mg, Q24H  [START ON 2/22/2025] cefTRIAXone, 2,000 mg, Daily  ipratropium-albuterol, 3 mL, Q6H  nicotine, 14 mg, Q24H  [START ON 2/22/2025] predniSONE, 40 mg, Daily          Continuous    multi-electrolyte, 75 mL/hr, Last Rate: 75 mL/hr (02/21/25 1325)         Labs:   CBC    Recent Labs     02/21/25  0856   WBC 8.53   HGB 14.7   HCT 41.1        BMP    Recent Labs     02/21/25  0855   SODIUM 127*   K 3.7   CL 90*   CO2 13*   AGAP 24*   *   CREATININE 7.00*   CALCIUM 10.0       Coags    Recent Labs     02/21/25  0855   INR 1.26*   PTT 30        Additional Electrolytes  No recent results       Blood Gas    No recent results  Recent Labs     02/21/25  0855   PHVEN 7.189*   TWS0YFD 39.1*   PO2VEN 70.5*   ODT2ZOF 14.6*   BEVEN -12.9   H0QIJNI 88.8*    LFTs  Recent Labs     02/21/25  0855   ALT 45   AST 38   ALKPHOS 22*   ALB 2.1*   TBILI 0.74       Infectious  Recent Labs     02/21/25  0855   PROCALCITONI 5.04*     Glucose  Recent Labs     02/21/25  0855   GLUC 147*

## 2025-02-21 NOTE — ASSESSMENT & PLAN NOTE
Corrected calcium 11.5 on admission  Strongly suspect volume depletion  Recheck calcium level at 2 PM

## 2025-02-21 NOTE — ASSESSMENT & PLAN NOTE
Presented with 5 days of shortness of breath  In the setting, COPD exacerbation versus atypical pneumonia  Requiring Bipap due to persistent desaturation and metabolic acidosis     Plan:  Titrated to NC O2 2 Lts   Respiratory protocol  Breath treatment   Continue titrate to NC O2 with a goal sat >88%

## 2025-02-21 NOTE — ASSESSMENT & PLAN NOTE
Elevated anion gap acidosis  Bicarbonate level 13  VBG pH 7.189  Multifactorial with acute respiratory failure, acute renal failure  Status post 3 L of Plasma-Lyte  Recheck labs at 2 PM

## 2025-02-21 NOTE — ASSESSMENT & PLAN NOTE
Acidosis metabolic with increased anion gap and respiratory compensation   Suspect in the setting of kennedy lactic acid due to sepsis and ADIEL  Current clinically stable   Nephrology was consulted in the ED  Received 3 LT bolus of fluids in the ED    Plan:  Will repeat labs per Nephrology  Will continue monitoring and no need for dialysis at this time  Nephrology on board

## 2025-02-21 NOTE — ASSESSMENT & PLAN NOTE
It;s been controlled in the past  Home meds: lisinopril-hydrochlorothiazide 20-25 mg daily   Was hypotensive in the ED due to sepsis     Plan:  Hold home meds   Monitor BP   Consider labetalol of BP>180/110 mmHg and symptoms   Low sodium diet

## 2025-02-21 NOTE — ASSESSMENT & PLAN NOTE
Presented with hypotension, tachycardia and tachypnea  Was having fever for the 3 days ago  Lactic acid 2.6, post IV fluids 2.0  S.p 3 Lts bolus Isolyte in the ED   Suspected source: bacterial pneumonia v/s influenza A  With ADIEL due to Creatinine 7.0    Plan:  Maintenance fluids at 75 ml/hr

## 2025-02-21 NOTE — ASSESSMENT & PLAN NOTE
Suspected in the setting of dehydration, due to patient was having watery diarrhea for 3 days and not drinking water for the past few days, also patient is in ACE inhibitor and was taking NSAIDs at home  Likely prerenal cause.  Elevated creatinine 7.0 (baseline~1)  Nephrology consulted, will repeat labs ar 2 p.m.  S/p 3lt of isolate bolus in the ED    Plan:  Monitoring labs  Maintenance fluids at 75 ml/hr

## 2025-02-21 NOTE — ASSESSMENT & PLAN NOTE
Presented with acute respiratory failure, requiring BiPAP  Patient having shortness of breath for the past 5 days, chills and severe cough.   Patient meeting sepsis in the setting of infection in the ED  CT chest 2/21/2025: Right basilar consolidation and advanced diffuse pulmonary centrilobular and groundglass opacities indicate pneumonia. Atypical pneumonia can have this appearance  CXR: mild congestion   CURB 65 score: 2 point      Plan:  Continue azithromycin 500 mg p.o daily x 4 days   Waiting blood cultures  Legionella and strep pneumoniae antigen  Sputum culture and gram stain   Consider de-escalation or escalate Antibiotics after cultures results

## 2025-02-22 PROBLEM — E87.20 METABOLIC ACIDOSIS: Status: ACTIVE | Noted: 2025-02-22

## 2025-02-22 PROBLEM — J11.1 FLU: Status: ACTIVE | Noted: 2025-02-22

## 2025-02-22 LAB
ALBUMIN SERPL BCG-MCNC: 3.4 G/DL (ref 3.5–5)
ALP SERPL-CCNC: 21 U/L (ref 34–104)
ALT SERPL W P-5'-P-CCNC: 36 U/L (ref 7–52)
ANION GAP SERPL CALCULATED.3IONS-SCNC: 14 MMOL/L (ref 4–13)
AST SERPL W P-5'-P-CCNC: 31 U/L (ref 13–39)
BASOPHILS # BLD MANUAL: 0 THOUSAND/UL (ref 0–0.1)
BASOPHILS NFR MAR MANUAL: 0 % (ref 0–1)
BILIRUB SERPL-MCNC: 0.68 MG/DL (ref 0.2–1)
BUN SERPL-MCNC: 104 MG/DL (ref 5–25)
CALCIUM ALBUM COR SERPL-MCNC: 10 MG/DL (ref 8.3–10.1)
CALCIUM SERPL-MCNC: 9.5 MG/DL (ref 8.4–10.2)
CHLORIDE SERPL-SCNC: 94 MMOL/L (ref 96–108)
CO2 SERPL-SCNC: 27 MMOL/L (ref 21–32)
CREAT SERPL-MCNC: 2.55 MG/DL (ref 0.6–1.3)
EOSINOPHIL # BLD MANUAL: 0 THOUSAND/UL (ref 0–0.4)
EOSINOPHIL NFR BLD MANUAL: 0 % (ref 0–6)
ERYTHROCYTE [DISTWIDTH] IN BLOOD BY AUTOMATED COUNT: 12.5 % (ref 11.6–15.1)
GFR SERPL CREATININE-BSD FRML MDRD: 26 ML/MIN/1.73SQ M
GLUCOSE SERPL-MCNC: 145 MG/DL (ref 65–140)
HCT VFR BLD AUTO: 32 % (ref 36.5–49.3)
HGB BLD-MCNC: 11.6 G/DL (ref 12–17)
LYMPHOCYTES # BLD AUTO: 1.22 THOUSAND/UL (ref 0.6–4.47)
LYMPHOCYTES # BLD AUTO: 10 % (ref 14–44)
MAGNESIUM SERPL-MCNC: 3.1 MG/DL (ref 1.9–2.7)
MCH RBC QN AUTO: 31.6 PG (ref 26.8–34.3)
MCHC RBC AUTO-ENTMCNC: 36.3 G/DL (ref 31.4–37.4)
MCV RBC AUTO: 87 FL (ref 82–98)
METAMYELOCYTE ABSOLUTE CT: 0.1 THOUSAND/UL (ref 0–0.1)
METAMYELOCYTES NFR BLD MANUAL: 1 % (ref 0–1)
MONOCYTES # BLD AUTO: 0.71 THOUSAND/UL (ref 0–1.22)
MONOCYTES NFR BLD: 7 % (ref 4–12)
NEUTROPHILS # BLD MANUAL: 8.14 THOUSAND/UL (ref 1.85–7.62)
NEUTS BAND NFR BLD MANUAL: 2 % (ref 0–8)
NEUTS SEG NFR BLD AUTO: 78 % (ref 43–75)
PHOSPHATE SERPL-MCNC: 4.1 MG/DL (ref 2.7–4.5)
PLATELET # BLD AUTO: 332 THOUSANDS/UL (ref 149–390)
PLATELET BLD QL SMEAR: ADEQUATE
PMV BLD AUTO: 8.8 FL (ref 8.9–12.7)
POTASSIUM SERPL-SCNC: 3 MMOL/L (ref 3.5–5.3)
PROCALCITONIN SERPL-MCNC: 2.2 NG/ML
PROT SERPL-MCNC: 7.5 G/DL (ref 6.4–8.4)
RBC # BLD AUTO: 3.67 MILLION/UL (ref 3.88–5.62)
RBC MORPH BLD: NORMAL
SODIUM SERPL-SCNC: 135 MMOL/L (ref 135–147)
VARIANT LYMPHS # BLD AUTO: 2 %
WBC # BLD AUTO: 10.17 THOUSAND/UL (ref 4.31–10.16)

## 2025-02-22 PROCEDURE — 87077 CULTURE AEROBIC IDENTIFY: CPT

## 2025-02-22 PROCEDURE — 87181 SC STD AGAR DILUTION PER AGT: CPT

## 2025-02-22 PROCEDURE — 99232 SBSQ HOSP IP/OBS MODERATE 35: CPT | Performed by: INTERNAL MEDICINE

## 2025-02-22 PROCEDURE — 85027 COMPLETE CBC AUTOMATED: CPT

## 2025-02-22 PROCEDURE — 84145 PROCALCITONIN (PCT): CPT

## 2025-02-22 PROCEDURE — 94660 CPAP INITIATION&MGMT: CPT

## 2025-02-22 PROCEDURE — 94760 N-INVAS EAR/PLS OXIMETRY 1: CPT

## 2025-02-22 PROCEDURE — NC001 PR NO CHARGE: Performed by: INTERNAL MEDICINE

## 2025-02-22 PROCEDURE — 84100 ASSAY OF PHOSPHORUS: CPT

## 2025-02-22 PROCEDURE — 87205 SMEAR GRAM STAIN: CPT

## 2025-02-22 PROCEDURE — 85007 BL SMEAR W/DIFF WBC COUNT: CPT

## 2025-02-22 PROCEDURE — 87070 CULTURE OTHR SPECIMN AEROBIC: CPT

## 2025-02-22 PROCEDURE — 80053 COMPREHEN METABOLIC PANEL: CPT

## 2025-02-22 PROCEDURE — 94640 AIRWAY INHALATION TREATMENT: CPT

## 2025-02-22 PROCEDURE — 83735 ASSAY OF MAGNESIUM: CPT

## 2025-02-22 RX ORDER — AZITHROMYCIN 250 MG/1
500 TABLET, FILM COATED ORAL EVERY 24 HOURS
Status: DISCONTINUED | OUTPATIENT
Start: 2025-02-22 | End: 2025-02-22

## 2025-02-22 RX ORDER — AMOXICILLIN 875 MG/1
875 TABLET, COATED ORAL EVERY 12 HOURS SCHEDULED
Status: CANCELLED | OUTPATIENT
Start: 2025-02-22

## 2025-02-22 RX ORDER — POTASSIUM CHLORIDE 1500 MG/1
40 TABLET, EXTENDED RELEASE ORAL
Status: COMPLETED | OUTPATIENT
Start: 2025-02-22 | End: 2025-02-22

## 2025-02-22 RX ORDER — METHYLPREDNISOLONE SODIUM SUCCINATE 40 MG/ML
40 INJECTION, POWDER, LYOPHILIZED, FOR SOLUTION INTRAMUSCULAR; INTRAVENOUS EVERY 12 HOURS SCHEDULED
Status: DISCONTINUED | OUTPATIENT
Start: 2025-02-22 | End: 2025-02-24

## 2025-02-22 RX ORDER — HEPARIN SODIUM 5000 [USP'U]/ML
5000 INJECTION, SOLUTION INTRAVENOUS; SUBCUTANEOUS EVERY 8 HOURS SCHEDULED
Status: DISCONTINUED | OUTPATIENT
Start: 2025-02-22 | End: 2025-02-26 | Stop reason: HOSPADM

## 2025-02-22 RX ORDER — MONTELUKAST SODIUM 10 MG/1
10 TABLET ORAL DAILY
Status: DISCONTINUED | OUTPATIENT
Start: 2025-02-22 | End: 2025-02-26 | Stop reason: HOSPADM

## 2025-02-22 RX ADMIN — HEPARIN SODIUM 5000 UNITS: 5000 INJECTION INTRAVENOUS; SUBCUTANEOUS at 14:49

## 2025-02-22 RX ADMIN — METHYLPREDNISOLONE SODIUM SUCCINATE 40 MG: 40 INJECTION, POWDER, FOR SOLUTION INTRAMUSCULAR; INTRAVENOUS at 05:28

## 2025-02-22 RX ADMIN — POTASSIUM CHLORIDE 40 MEQ: 1500 TABLET, EXTENDED RELEASE ORAL at 07:29

## 2025-02-22 RX ADMIN — MIRTAZAPINE 30 MG: 15 TABLET, FILM COATED ORAL at 18:05

## 2025-02-22 RX ADMIN — SODIUM BICARBONATE 100 ML/HR: 84 INJECTION, SOLUTION INTRAVENOUS at 03:12

## 2025-02-22 RX ADMIN — HEPARIN SODIUM 5000 UNITS: 5000 INJECTION INTRAVENOUS; SUBCUTANEOUS at 08:18

## 2025-02-22 RX ADMIN — UMECLIDINIUM 1 PUFF: 62.5 AEROSOL, POWDER ORAL at 08:18

## 2025-02-22 RX ADMIN — IPRATROPIUM BROMIDE 0.5 MG: 0.5 SOLUTION RESPIRATORY (INHALATION) at 13:32

## 2025-02-22 RX ADMIN — HEPARIN SODIUM 5000 UNITS: 5000 INJECTION INTRAVENOUS; SUBCUTANEOUS at 21:53

## 2025-02-22 RX ADMIN — POTASSIUM CHLORIDE 40 MEQ: 1500 TABLET, EXTENDED RELEASE ORAL at 16:30

## 2025-02-22 RX ADMIN — CEFTRIAXONE 1000 MG: 1 INJECTION, POWDER, FOR SOLUTION INTRAMUSCULAR; INTRAVENOUS at 11:54

## 2025-02-22 RX ADMIN — LEVALBUTEROL HYDROCHLORIDE 1.25 MG: 1.25 SOLUTION RESPIRATORY (INHALATION) at 13:28

## 2025-02-22 RX ADMIN — MONTELUKAST 10 MG: 10 TABLET, FILM COATED ORAL at 08:23

## 2025-02-22 RX ADMIN — AZITHROMYCIN DIHYDRATE 500 MG: 250 TABLET ORAL at 10:39

## 2025-02-22 RX ADMIN — IPRATROPIUM BROMIDE 0.5 MG: 0.5 SOLUTION RESPIRATORY (INHALATION) at 07:42

## 2025-02-22 RX ADMIN — POTASSIUM CHLORIDE 40 MEQ: 1500 TABLET, EXTENDED RELEASE ORAL at 11:54

## 2025-02-22 RX ADMIN — ARIPIPRAZOLE 5 MG: 10 TABLET ORAL at 08:18

## 2025-02-22 RX ADMIN — FLUTICASONE FUROATE AND VILANTEROL TRIFENATATE 1 PUFF: 200; 25 POWDER RESPIRATORY (INHALATION) at 08:18

## 2025-02-22 RX ADMIN — ALBUTEROL SULFATE 2 PUFF: 90 AEROSOL, METERED RESPIRATORY (INHALATION) at 23:01

## 2025-02-22 RX ADMIN — METHYLPREDNISOLONE SODIUM SUCCINATE 40 MG: 40 INJECTION, POWDER, FOR SOLUTION INTRAMUSCULAR; INTRAVENOUS at 21:53

## 2025-02-22 RX ADMIN — DULOXETINE HYDROCHLORIDE 60 MG: 60 CAPSULE, DELAYED RELEASE ORAL at 08:18

## 2025-02-22 RX ADMIN — LEVALBUTEROL HYDROCHLORIDE 1.25 MG: 1.25 SOLUTION RESPIRATORY (INHALATION) at 07:42

## 2025-02-22 NOTE — PROGRESS NOTES
Transferred patient via wheelchair to [facility]. VSS. 3LNC. Verbal SBAR given. Belongings sent with patient. Floor aware of patient arrivals.

## 2025-02-22 NOTE — ASSESSMENT & PLAN NOTE
Presented with 5 days of shortness of breath  In the setting, COPD exacerbation versus atypical pneumonia  In the ED- Requiring Bipap due to persistent desaturation and metabolic acidosis     Plan:  Titrated to NC O2 2 Lts   Respiratory protocol  Breath treatment   Continue titrate to NC O2 with a goal sat >88%.

## 2025-02-22 NOTE — PROGRESS NOTES
Progress Note - Critical Care/ICU   Name: Luis Antonio Quiñones 60 y.o. male I MRN: [id number]  Unit/Bed#: ICU 10 I Date of Admission: 2/21/2025   Date of Service: 2/22/2025 I Hospital Day: 1       Critical Care Interval Transfer Note:    Brief Hospital Summary:   Luis Antonio Quiñones is a 60 y.o. with PMH of COPD, HTN, Hypercholesterolemia, Alcohol and nicotine dependence, who presents to the ED complaining of shortness of breath for the past 5 days and non-productive cough. Also chest tightness, subjective fever and watery diarrhea for 3 days, however those symptoms stopped.  Patient was found to have acute respite failure, ADIEL, hyperkalemia, hyponatremia, metabolic acidosis- was putting in BiPAP due to metabolic acidosis and desat. Admitted here for acute respiratory failure requiring Bipap. Also ADIEL @7 which has improved significantly with IVF.    Patient is now stable with 3L NC. Medically stable to be downgraded today.      Barriers to discharge:   Ongoing respiratory treatment  ADIEL  Will need pulm referral upon discharge.     Consults: IP CONSULT TO NEPHROLOGY  IP CONSULT TO CASE MANAGEMENT    Recommended to review admission imaging for incidental findings and document in discharge navigator: Chart reviewed, no known incidental findings noted at this time.      Discharge Plan: Anticipate discharge in 48 hrs to home.  Tinajero Plan: Tinajero to be removed. Order has been placed       Patient seen and evaluated by Critical Care today and deemed to be appropriate for transfer to Med Surg. Spoke to Dr. Rivera from [facility] to accept transfer. Critical care can be contacted via SecureChat with any questions or concerns. Please use the Critical Care AP Role in Secure Chat for any provider inquires until the patient is transferred out of the ICU or until tomorrow at 0600.

## 2025-02-22 NOTE — PLAN OF CARE
Problem: Potential for Falls  Goal: Patient will remain free of falls  Description: INTERVENTIONS:  - Educate patient/family on patient safety including physical limitations  - Instruct patient to call for assistance with activity   - Consult OT/PT to assist with strengthening/mobility   - Keep Call bell within reach  - Keep bed low and locked with side rails adjusted as appropriate  - Keep care items and personal belongings within reach  - Initiate and maintain comfort rounds  - Make Fall Risk Sign visible to staff  - Offer Toileting every 2 Hours, in advance of need  - Initiate/Maintain bed alarm  - Obtain necessary fall risk management equipment  - Apply yellow socks and bracelet for high fall risk patients  - Consider moving patient to room near nurses station  Outcome: Progressing     Problem: PAIN - ADULT  Goal: Verbalizes/displays adequate comfort level or baseline comfort level  Description: Interventions:  - Encourage patient to monitor pain and request assistance  - Assess pain using appropriate pain scale  - Administer analgesics based on type and severity of pain and evaluate response  - Implement non-pharmacological measures as appropriate and evaluate response  - Consider cultural and social influences on pain and pain management  - Notify physician/advanced practitioner if interventions unsuccessful or patient reports new pain  Outcome: Progressing     Problem: INFECTION - ADULT  Goal: Absence or prevention of progression during hospitalization  Description: INTERVENTIONS:  - Assess and monitor for signs and symptoms of infection  - Monitor lab/diagnostic results  - Monitor all insertion sites, i.e. indwelling lines, tubes, and drains  - Monitor endotracheal if appropriate and nasal secretions for changes in amount and color  - [place] appropriate cooling/warming therapies per order  - Administer medications as ordered  - Instruct and encourage patient and family to use good hand hygiene  technique  - Identify and instruct in appropriate isolation precautions for identified infection/condition  Outcome: Progressing  Goal: Absence of fever/infection during neutropenic period  Description: INTERVENTIONS:  - Monitor WBC    Outcome: Progressing     Problem: SAFETY ADULT  Goal: Patient will remain free of falls  Description: INTERVENTIONS:  - Educate patient/family on patient safety including physical limitations  - Instruct patient to call for assistance with activity   - Consult OT/PT to assist with strengthening/mobility   - Keep Call bell within reach  - Keep bed low and locked with side rails adjusted as appropriate  - Keep care items and personal belongings within reach  - Initiate and maintain comfort rounds  - Make Fall Risk Sign visible to staff  - Offer Toileting every 2 Hours, in advance of need  - Initiate/Maintain bed alarm  - Obtain necessary fall risk management equipment  - Apply yellow socks and bracelet for high fall risk patients  - Consider moving patient to room near nurses station  Outcome: Progressing  Goal: Maintain or return to baseline ADL function  Description: INTERVENTIONS:  -  Assess patient's ability to carry out ADLs; assess patient's baseline for ADL function and identify physical deficits which impact ability to perform ADLs (bathing, care of mouth/teeth, toileting, grooming, dressing, etc.)  - Assess/evaluate cause of self-care deficits   - Assess range of motion  - Assess patient's mobility; develop plan if impaired  - Assess patient's need for assistive devices and provide as appropriate  - Encourage maximum independence but intervene and supervise when necessary  - Involve family in performance of ADLs  - Assess for home care needs following discharge   - Consider OT consult to assist with ADL evaluation and planning for discharge  - Provide patient education as appropriate  Outcome: Progressing  Goal: Maintains/Returns to pre admission functional level  Description:  INTERVENTIONS:  - Perform AM-PAC 6 Click Basic Mobility/ Daily Activity assessment daily.  - Set and communicate daily mobility goal to care team and patient/family/caregiver.   - Collaborate with rehabilitation services on mobility goals if consulted  - Perform Range of Motion 3 times a day.  - Reposition patient every 2 hours.  - Dangle patient 3 times a day  - Stand patient 3 times a day  - Ambulate patient 3 times a day  - Out of bed to chair 3 times a day   - Out of bed for meals 3 times a day  - Out of bed for toileting  - Record patient progress and toleration of activity level   Outcome: Progressing     Problem: DISCHARGE PLANNING  Goal: Discharge to home or other facility with appropriate resources  Description: INTERVENTIONS:  - Identify barriers to discharge w/patient and caregiver  - Arrange for needed discharge resources and transportation as appropriate  - Identify discharge learning needs (meds, wound care, etc.)  - Arrange for interpretive services to assist at discharge as needed  - Refer to Case Management Department for coordinating discharge planning if the patient needs post-hospital services based on physician/advanced practitioner order or complex needs related to functional status, cognitive ability, or social support system  Outcome: Progressing     Problem: Knowledge Deficit  Goal: Patient/family/caregiver demonstrates understanding of disease process, treatment plan, medications, and discharge instructions  Description: Complete learning assessment and assess knowledge base.  Interventions:  - Provide teaching at level of understanding  - Provide teaching via preferred learning methods  Outcome: Progressing     Problem: RESPIRATORY - ADULT  Goal: Achieves optimal ventilation and oxygenation  Description: INTERVENTIONS:  - Assess for changes in respiratory status  - Assess for changes in mentation and behavior  - Position to facilitate oxygenation and minimize respiratory effort  - Oxygen  administered by appropriate delivery if ordered  - Initiate smoking cessation education as indicated  - Encourage broncho-pulmonary hygiene including cough, deep breathe, Incentive Spirometry  - Assess the need for suctioning and aspirate as needed  - Assess and instruct to report SOB or any respiratory difficulty  - Respiratory Therapy support as indicated  Outcome: Progressing     Problem: Prexisting or High Potential for Compromised Skin Integrity  Goal: Skin integrity is maintained or improved  Description: INTERVENTIONS:  - Identify patients at risk for skin breakdown  - Assess and monitor skin integrity  - Assess and monitor nutrition and hydration status  - Monitor labs   - Assess for incontinence   - Turn and reposition patient  - Assist with mobility/ambulation  - Relieve pressure over bony prominences  - Avoid friction and shearing  - Provide appropriate hygiene as needed including keeping skin clean and dry  - Evaluate need for skin moisturizer/barrier cream  - Collaborate with interdisciplinary team   - Patient/family teaching  - Consider wound care consult   Outcome: Progressing

## 2025-02-22 NOTE — PROGRESS NOTES
Progress Note - Critical Care/ICU   Name: Luis Antonio Quiñones 60 y.o. male I MRN: [id number]  Unit/Bed#: ICU 10 I Date of Admission: 2/21/2025   Date of Service: 2/22/2025 I Hospital Day: 1      Assessment & Plan  Acute respiratory failure (HCC)  Presented with 5 days of shortness of breath  In the setting, COPD exacerbation versus atypical pneumonia  In the ED- Requiring Bipap due to persistent desaturation and metabolic acidosis     Plan:  Titrated to NC O2 2 Lts   Respiratory protocol  Breath treatment   Continue titrate to NC O2 with a goal sat >88%.  COPD (chronic obstructive pulmonary disease) ([facility])  2019 Spirometry:  The FVC is reduced [3.34L (72%)].  The FEV1 is severely reduced [1.78L (49%)].  The FEV1/FVC is reduced [53%].   CT of chest 2/21/25: Right basilar consolidation and advanced diffuse pulmonary centrilobular and groundglass opacities indicate pneumonia. Atypical pneumonia can have this appearance   Home meds: Albuterol 2 puffs every 4 hours as needed, Advair 250-50 1 puff twice daily, Spiriva 2.5 mcg 1 puff twice daily, montelukast 10 mg daily and Flonase 50 mg daily  S/p in the ED azithromycin 500 mg IV , ceftriaxone 2 g and Solu-Medrol 125 mg by EMS    Plan:  To complete azithromycin 500 mg  for 3 days  Continue abx for strep pneumo  Scheduled nebs. Continue home breathing treatment  Started on IV Solu-Medrol 40 mg every 8 hours.  Likely can do every 12 hours today.  Respiratory protocol  Smoking cessation strongly recommended.  Acute kidney injury (HCC)  Suspected in the setting of dehydration, due to patient was having watery diarrhea for 3 days and not drinking water for the past few days, also patient is in ACE inhibitor and was taking NSAIDs at home  Likely prerenal cause.  Elevated creatinine 7.0 (baseline~1)  Nephrology consulted, will repeat labs ar 2 p.m.  S/p 3lt of isolate bolus in the ED    Plan:  Improving.  Can dc gtts if PO intake improves.    Flu A  Supportive care.  Not on tamiflu  since out of therapeutic window.  Pneumonia  Presented with acute respiratory failure, requiring BiPAP  Patient having shortness of breath for the past 5 days, chills and severe cough.   Patient meeting sepsis in the setting of infection in the ED  CT chest 2/21/2025: Right basilar consolidation and advanced diffuse pulmonary centrilobular and groundglass opacities indicate pneumonia. Atypical pneumonia can have this appearance  CXR: mild congestion   CURB 65 score: 2 point   Legionella and strep pneumoniae antigen- strep pneumo positive     Plan:  To complete Azithromycin x 3 days- should also cover strep pneum.  Waiting blood cultures  Sputum culture and gram stain   Consider de-escalation or escalate Antibiotics after cultures results     Disorder of acid-base balance  Acidosis metabolic with increased anion gap and respiratory compensation   Suspect in the setting of kennedy lactic acid due to sepsis and ADIEL  Current clinically stable   Nephrology was consulted in the ED  Received 3 LT bolus of fluids in the ED    Plan:  Improving on Bicarb gtt.  Appreciate nephro's input.    Benign essential hypertension  It;s been controlled in the past  Home meds: lisinopril-hydrochlorothiazide 20-25 mg daily   Was hypotensive in the ED due to sepsis     Plan:  Hold home meds in the setting of elevated Cr.  Monitor BP   Consider labetalol of BP>180/110 mmHg and symptoms   Low sodium diet    Current mild episode of major depressive disorder (HCC)  Currently controlled and asymptomatic   Home meds: Abilify 5 mg, Cymbalta 60 mg and Remeron 30 mg at bedtime   Continue home meds.  Hyponatremia  Na 127  Suspect could be in the setting of dehydration and hydrochlorothiazide  Currently asymptomatic      Plan:  Improving on IVF.   Daily monitoring.  Hypercalcemia  In the setting of volume depletion.   Nephrology in on board  Sepsis with acute organ dysfunction (HCC)  Presented with hypotension, tachycardia and tachypnea  Was having  fever for the 3 days ago  Lactic acid 2.6, post IV fluids 2.0  S.p 3 Lts bolus Isolyte in the ED   Suspected source: bacterial pneumonia v/s influenza A  With ADIEL due to Creatinine 7.0      Metabolic acidosis    Disposition: Med Surg    ICU Core Measures     A: Assess, Prevent, and Manage Pain Has pain been assessed? Yes  Need for changes to pain regimen? No   B: Both SAT/SAT  N/A   C: Choice of Sedation RASS Goal: 0 Alert and Calm  Need for changes to sedation or analgesia regimen? No   D: Delirium CAM-ICU: Negative   E: Early Mobility  Plan for early mobility? Yes   F: Family Engagement Plan for family engagement today? Yes       Antibiotic Review: Awaiting culture results.     Review of Invasive Devices:    Tinajero Plan: Tinajero to be removed. Order has been placed and Voiding trial after improvement in ambulation         Prophylaxis:  VTE VTE covered by:  heparin (porcine), Subcutaneous, 5,000 Units at 02/22/25 0818       Stress Ulcer  not ordered         24 Hour Events :     Patient was put on BiPAP overnight.  Was able to wean off and stable 2 L NC O2.  Denied any acute complaints.      Subjective   Seen above.    Objective :                   Vitals I/O      Most Recent Min/Max in 24hrs   Temp 99.2 °F (37.3 °C) Temp  Min: 97.3 °F (36.3 °C)  Max: 99.2 °F (37.3 °C)   Pulse (!) 106 Pulse  Min: 86  Max: 124   Resp 20 Resp  Min: 13  Max: 29   /89 BP  Min: 93/56  Max: 157/87   O2 Sat 96 % SpO2  Min: 90 %  Max: 100 %      Intake/Output Summary (Last 24 hours) at 2/22/2025 0940  Last data filed at 2/22/2025 0600  Gross per 24 hour   Intake 4143.33 ml   Output 2450 ml   Net 1693.33 ml       Diet Cardiovascular; Sodium 2 GM; Sodium 2 GM    Invasive Monitoring           Physical Exam   Physical Exam  Vitals and nursing note reviewed.   Eyes:      General: Vision grossly intact.         Right eye: No discharge.         Left eye: No discharge.      Extraocular Movements: Extraocular movements intact.   Skin:      General: Skin is warm.      Capillary Refill: Capillary refill takes less than 2 seconds.      Findings: No rash.   HENT:      Head: Normocephalic and atraumatic.      Mouth/Throat:      Mouth: Mucous membranes are dry.   Cardiovascular:      Rate and Rhythm: Normal rate and regular rhythm.      Pulses: Normal pulses.   Musculoskeletal:         General: Normal range of motion.      Right lower leg: No edema.      Left lower leg: No edema.   Abdominal: General: Bowel sounds are normal.      Palpations: Abdomen is soft.      Tenderness: There is no abdominal tenderness. There is no guarding.   Constitutional:       General: He is not in acute distress.     Appearance: He is well-developed.   Pulmonary:      Effort: Tachypnea present. No respiratory distress.      Breath sounds: Wheezing present. No rhonchi or rales.      Comments: 2L NC  Psychiatric:         Mood and Affect: Mood and affect normal.   Neurological:      General: No focal deficit present.      Mental Status: He is alert and oriented to person, place and time. He is CAM ICU negative.      Cranial Nerves: No dysarthria or facial asymmetry.   Genitourinary/Anorectal:  Tinajero present.        Diagnostic Studies        Lab Results: I have reviewed the following results:     Medications:  Scheduled PRN   ARIPiprazole, 5 mg, Daily  azithromycin, 500 mg, Q24H  DULoxetine, 60 mg, Daily  fluticasone-vilanterol, 1 puff, Daily  heparin (porcine), 5,000 Units, Q8H AMARILIS  ipratropium, 0.5 mg, 4x Daily  levalbuterol, 1.25 mg, TID  methylPREDNISolone sodium succinate, 40 mg, Q8H AMARILIS  mirtazapine, 30 mg, QPM  montelukast, 10 mg, Daily  nicotine, 14 mg, Q24H  potassium chloride, 40 mEq, TID With Meals  umeclidinium, 1 puff, Daily      albuterol, 2 puff, Q4H PRN       Continuous            Labs:   CBC    Recent Labs     02/21/25  0856 02/22/25  0518   WBC 8.53 10.17*   HGB 14.7 11.6*   HCT 41.1 32.0*    332   BANDSPCT  --  2     BMP    Recent Labs     02/21/25 2029  02/22/25  0518   SODIUM 132* 135   K 3.4* 3.0*   CL 95* 94*   CO2 20* 27   AGAP 17* 14*   * 104*   CREATININE 4.03* 2.55*   CALCIUM 9.1 9.5       Coags    Recent Labs     02/21/25  0855   INR 1.26*   PTT 30        Additional Electrolytes  Recent Labs     02/22/25  0518   MG 3.1*   PHOS 4.1          Blood Gas    No recent results  Recent Labs     02/21/25  0855   PHVEN 7.189*   JED7RDE 39.1*   PO2VEN 70.5*   [id number] 14.6*   BEVEN -12.9   Q1YNSCA 88.8*    LFTs  Recent Labs     02/21/25  0855 02/22/25  0518   ALT 45 36   AST 38 31   ALKPHOS 22* 21*   ALB 2.1* 3.4*   TBILI 0.74 0.68       Infectious  Recent Labs     02/21/25  0855   PROCALCITONI 5.04*     Glucose  Recent Labs     02/21/25  0855 02/21/25  1534 02/21/25 2029 02/22/25  0518   GLUC 147* 181* 186* 145*

## 2025-02-22 NOTE — CASE MANAGEMENT
Case Management Assessment & Discharge Planning Note    Patient name Luis Antonio Quiñones  Location ICU 10/ICU 10 MRN [id number]  : 1965 Date 2025       Current Admission Date: 2025  Current Admission Diagnosis:Acute respiratory failure (HCC)   Patient Active Problem List    Diagnosis Date Noted Date Diagnosed    Flu A 2025     Metabolic acidosis 2025     Acute respiratory failure (HCC) 2025     Pneumonia 2025     Acute kidney injury (HCC) 2025     Hyponatremia 2025     Disorder of acid-base balance 2025     Hypercalcemia 2025     Sepsis with acute organ dysfunction (HCC) 2025     Current mild episode of major depressive disorder (HCC) 2019     Cervical spondylosis without myelopathy 2017     COPD (chronic obstructive pulmonary disease) ([facility]) 10/30/2017     Chronic bilateral low back pain with bilateral sciatica 2017     Hypercholesterolemia 2011     Benign essential hypertension 2010     Alcohol dependence ([facility]) 2006       LOS (days): 1  Geometric Mean LOS (GMLOS) (days):   Days to GMLOS:     OBJECTIVE:    Risk of Unplanned Readmission Score: 16.54         Current admission status: Inpatient  Referral Reason: Other (Social concerns)    Preferred Pharmacy:   [id number] - [address] [address]  Phone: [phone] Fax: [phone]    Primary Care Provider: Adams Villela MD    Primary Insurance: [employer] [facility]  Secondary Insurance:     ASSESSMENT:  Active Health Care Proxies    There are no active Health Care Proxies on file.       Advance Directives  Does patient have a Health Care POA?: No  Was patient offered paperwork?: Yes (Declines)  Does patient currently have a Health Care decision maker?: No  Does patient have Advance Directives?: No  Was patient offered paperwork?: Yes (Declines)  Primary Contact: nell amaro  (Mother)  [phone] (Home Phone)         Readmission Root Cause  30 Day Readmission: No    Patient Information  Admitted from:: Home  Mental Status: Alert  During Assessment patient was accompanied by: Not accompanied during assessment  Assessment information provided by:: Patient  Primary Caregiver: Self  Support Systems: Self, Family members, Parent  County of Residence: [place]  What city do you live in?: [place]  Home entry access options. Select all that apply.: Stairs  Number of steps to enter home.: 3  Do the steps have railings?: Yes  Type of Current Residence: 3 story home  Upon entering residence, is there a bedroom on the main floor (no further steps)?: No  A bedroom is located on the following floor levels of residence (select all that apply):: 3rd Floor  Upon entering residence, is there a bathroom on the main floor (no further steps)?: No  Indicate which floors of current residence have a bathroom (select all the apply):: 2nd Floor  Number of steps to 2nd floor from main floor: One Flight  Number of steps to 3rd floor from main floor: Two Flights  Living Arrangements: Lives w/ Parent(s), Lives w/ Extended Family  Is patient a ?: No    Activities of Daily Living Prior to Admission  Functional Status: Independent  Completes ADLs independently?: Yes  Ambulates independently?: Yes  Does patient use assisted devices?: No  Does patient currently own DME?: No  Does patient have a history of Outpatient Therapy (PT/OT)?: No  Does the patient have a history of Short-Term Rehab?: No  Does patient have a history of HHC?: No  Does patient currently have HHC?: No      Patient Information Continued  Income Source: Employed  Does patient have prescription coverage?: Yes  Does patient receive dialysis treatments?: No  Does patient have a history of substance abuse?: Yes  Historical substance use preference: Alcohol/ETOH  History of Withdrawal Symptoms: Denies past symptoms  Is patient currently in treatment  for substance abuse?: N/A - sober  Does patient have a history of Mental Health Diagnosis?: No    Means of Transportation  Means of Transport to Appts:: Family transport    DISCHARGE DETAILS:    Discharge planning discussed with:: Patient  Freedom of Choice: Yes     CM contacted family/caregiver?: No- see comments (AAOx3)  Were Treatment Team discharge recommendations reviewed with patient/caregiver?: Yes      Additional Comments: Met with patient at bedside in ICU.  Discharge needs TBD. Patient IPTA and lives with mom and family members.  Walks to MD appts and pharmacy.  Has a PCP updated in EPIC  CM department following thru discharge

## 2025-02-22 NOTE — ASSESSMENT & PLAN NOTE
Currently controlled and asymptomatic   Home meds: Abilify 5 mg, Cymbalta 60 mg and Remeron 30 mg at bedtime   Continue home meds.

## 2025-02-22 NOTE — ASSESSMENT & PLAN NOTE
Suspected in the setting of dehydration, due to patient was having watery diarrhea for 3 days and not drinking water for the past few days, also patient is in ACE inhibitor and was taking NSAIDs at home  Likely prerenal cause.  Elevated creatinine 7.0 (baseline~1)  Nephrology consulted, will repeat labs ar 2 p.m.  S/p 3lt of isolate bolus in the ED    Plan:  Improving.  Can dc gtts if PO intake improves.

## 2025-02-22 NOTE — UTILIZATION REVIEW
Initial Clinical Review    Admission: Date/Time/Statement:   Admission Orders (From admission, onward)       Ordered        02/21/25 1213  INPATIENT ADMISSION  Once                          Orders Placed This Encounter   Procedures    INPATIENT ADMISSION     Standing Status:   Standing     Number of Occurrences:   1     Level of Care:   Critical Care [15]     Estimated length of stay:   More than 2 Midnights     Certification:   I certify that inpatient services are medically necessary for this patient for a duration of greater than two midnights. See H&P and MD Progress Notes for additional information about the patient's course of treatment.     ED Arrival Information       Expected   -    Arrival   2/21/2025 08:43    Acuity   Emergent              Means of arrival   Stretcher    Escorted by   [facility] ([facility])    Service   Critical Care/ICU    Admission type   Emergency              Arrival complaint   copd             Chief Complaint   Patient presents with    Breathing Difficulty     Began 4-5 days ago, now with productive cough. (+) sick contact       Initial Presentation: 60 y.o. male Presents to ED from home via EMS due to SOB x 5 days, productive cough. Presents in respiratory distress, O2 sat 81%, increased work of breathing Started on BiPap in ED CTAP concerning for pneumonia, patient given 2 g IV Rocephin, IV azithromycin in ED along with 30 mL/kg IV fluid bolus. Patient also found to have severe ADIEL, GFR of 7. /creatinine of 7, GFR 85-90 baseline Pt persistently desaturated when trialed off BiPap. Na 127, AG 24,  corrected calcium is elevated 11.5, procalcitonin 5.04, Lactic acid 2.6  Influenza A positive,  EKG ST, Admitted as Inpatient to critical care for acute respiratory failure, pneumonia, influenza A  ADIEL, Metabolic acidosis, hyponatremia , hypercalcemia, sepsis with acute organ dysfunction Plan Start sodium bicarb infusion, Solu medrol 40 mg IV q8, maintain O2 sat above 88,  transition to nasal cannula when able, Nebulizer treatments, continue Ceftriaxone and Azithromycin, Consult nephrology, monitor labs, hold home antihypertensives, monitor BP, contiue IV fluids 75 ml/h        Nephrology consult 2/21 Pt with severe acute kidney injury  creatinine is 7. Baseline creatinine May 2024 is 1.  Received 3 l Plasma lyte in ED Suspect secondary to prerenal component, poor appetite, diarrhea x 3-4 days, loss of autoregulation due to ACE inhibitor, prior diuretic use, NSAID. that progressed to ATN.  Plan keep holding  lisinopril and HCTZ, Follow labs at  1400, if acidosis persists, consider bicarb gtt. I/O,     Date: 2/22    Day 2:     Continues on supplemental O2, weaned to 2L NC, Strep pneumoniae antigen positive,  ADIEL improving, , Creatinine 2.55 continue ceftriaxone, DC azithromycin Decrease steroids to q12 h dosing  Eexam notes tachypnea, wheezing , 2LNC     data filed at 2/22/2025 0600      Gross per 24 hour   Intake 4143.33 ml   Output 2450 ml   Net 1693.33 ml            ED Treatment-Medication Administration from 02/21/2025 0843 to 02/21/2025 1308         Date/Time Order Dose Route Action     02/21/2025 0848 albuterol (FOR EMS ONLY) (2.5 mg/3 mL) 0.083 % inhalation solution 2.5 mg 0 mg Does not apply Given to EMS     02/21/2025 0848 ipratropium-albuterol (FOR EMS ONLY) (DUO-NEB) 0.5-2.5 mg/3 mL inhalation solution 3 mL 0 mL Does not apply Given to EMS     02/21/2025 0848 methylPREDNISolone sodium succinate (FOR EMS ONLY) (Solu-MEDROL) 125 MG injection 125 mg 0 mg Does not apply Given to EMS     02/21/2025 0901 albuterol inhalation solution 10 mg 10 mg Nebulization Given     02/21/2025 0901 ipratropium (ATROVENT) 0.02 % inhalation solution 1 mg 1 mg Nebulization Given     02/21/2025 0901 sodium chloride 0.9 % inhalation solution 12 mL 12 mL Nebulization Given     02/21/2025 0918 multi-electrolyte (ISOLYTE-S PH 7.4) bolus 1,000 mL 1,000 mL Intravenous New Bag     02/21/2025 0936  multi-electrolyte (ISOLYTE-S PH 7.4) bolus 2,000 mL 2,000 mL Intravenous New Bag     02/21/2025 1013 ceftriaxone (ROCEPHIN) 2 g/50 mL in dextrose IVPB 2,000 mg Intravenous New Bag     02/21/2025 1130 azithromycin (ZITHROMAX) 500 mg in sodium chloride 0.9 % 250 mL IVPB 500 mg Intravenous New Bag            Scheduled Medications:  ARIPiprazole, 5 mg, Oral, Daily  cefTRIAXone, 1,000 mg, Intravenous, Q24H  DULoxetine, 60 mg, Oral, Daily  fluticasone-vilanterol, 1 puff, Inhalation, Daily  heparin (porcine), 5,000 Units, Subcutaneous, Q8H AMARILIS  ipratropium, 0.5 mg, Nebulization, 4x Daily  levalbuterol, 1.25 mg, Nebulization, TID  methylPREDNISolone sodium succinate, 40 mg, Intravenous, Q12H AMARILIS  mirtazapine, 30 mg, Oral, QPM  montelukast, 10 mg, Oral, Daily  nicotine, 14 mg, Transdermal, Q24H  potassium chloride, 40 mEq, Oral, TID With Meals  umeclidinium, 1 puff, Inhalation, Daily      Continuous IV Infusions:     PRN Meds:  albuterol, 2 puff, Inhalation, Q4H PRN      ED Triage Vitals   Temperature Pulse Respirations Blood Pressure SpO2 Pain Score   02/21/25 0913 02/21/25 0851 02/21/25 0851 02/21/25 0851 02/21/25 0844 02/21/25 1320   97.6 °F (36.4 °C) (!) 138 (!) 40 109/89 (!) 81 % No Pain     Weight (last 2 days)       Date/Time Weight    02/21/25 1300 80.7 (177.91)    02/21/25 0935 81.1 (178.79)            Vital Signs (last 3 days)       Date/Time Temp Pulse Resp BP MAP (mmHg) SpO2 FiO2 (%) Calculated FIO2 (%) - Nasal Cannula O2 Flow Rate (L/min) Nasal Cannula O2 Flow Rate (L/min) O2 Device O2 Interface Device Patient Position - Orthostatic VS [place] Coma Scale Score CIWA-Ar Total Pain    02/22/25 1100 98.5 °F (36.9 °C) -- -- -- -- -- -- -- -- -- -- -- -- -- -- --    02/22/25 1000 -- 108 27 128/74 83 92 % -- -- -- -- -- -- -- -- -- --    02/22/25 0900 -- 112 20 161/91 121 91 % -- -- -- -- -- -- -- -- -- --    02/22/25 0800 99.1 °F (37.3 °C) 106 20 161/96 114 96 % -- -- -- -- -- -- -- 15 0 No Pain    02/22/25 0745 -- --  -- -- -- 96 % -- 32 -- 3 L/min Nasal cannula -- -- -- -- --    02/22/25 0700 99.2 °F (37.3 °C) 106 20 150/89 113 95 % -- -- -- -- -- -- -- -- -- --    02/22/25 0600 -- 104 21 157/87 102 94 % -- -- -- -- -- -- -- -- -- --    02/22/25 0534 98.9 °F (37.2 °C) -- -- -- -- -- -- -- -- -- -- -- -- -- -- --    02/22/25 0522 -- -- -- -- -- -- -- -- -- -- -- -- -- 15 -- --    02/22/25 0500 -- 106 29 139/79 102 91 % -- -- -- -- -- -- -- -- -- --    02/22/25 0400 -- 86 14 104/70 82 97 % -- 40 -- 5 L/min Nasal cannula -- Lying -- -- No Pain    02/22/25 0352 -- -- -- -- -- 97 % -- -- -- -- -- Full face mask -- -- -- --    02/22/25 0300 -- 90 13 104/64 74 97 % -- -- -- -- -- -- -- -- -- --    02/22/25 0200 -- 88 13 99/61 73 98 % -- -- -- -- -- -- -- -- -- --    02/22/25 0100 -- 94 15 111/69 82 97 % -- -- -- -- -- -- -- -- -- --    02/22/25 0004 -- -- -- -- -- -- -- -- -- -- -- -- -- 15 -- --    02/22/25 0000 -- 92 14 114/70 82 97 % 60 -- 6 L/min -- BiPAP -- Lying -- -- No Pain    02/21/25 2327 -- -- -- -- -- 97 % -- -- -- -- -- Full face mask -- -- -- --    02/21/25 2300 -- 96 14 111/69 78 97 % -- -- -- -- -- -- -- -- -- --    02/21/25 2237 97.7 °F (36.5 °C) -- -- -- -- -- -- -- -- -- -- -- -- -- -- --    02/21/25 2200 -- 94 15 111/76 87 97 % -- -- -- -- -- -- -- -- -- --    02/21/25 2100 -- 108 21 120/70 97 97 % -- -- -- -- -- -- -- -- -- --    02/21/25 2038 -- -- -- -- -- -- -- -- -- -- -- -- -- 15 -- No Pain    02/21/25 2036 97.3 °F (36.3 °C) -- -- -- -- -- -- -- -- -- -- -- -- -- -- --    02/21/25 2000 -- 104 15 121/70 86 98 % 60 -- -- -- BiPAP -- Lying -- -- No Pain    02/21/25 1934 -- -- -- -- -- 98 % -- -- -- -- -- Full face mask -- -- -- --    02/21/25 1900 -- 98 16 93/56 67 96 % -- -- -- -- -- -- -- -- -- --    02/21/25 1800 -- 110 23 146/67 98 90 % 60 -- -- -- BiPAP -- -- -- 0 --    02/21/25 1713 -- 110 22 149/79 122 93 % -- 32 -- 3 L/min Nasal cannula -- -- -- -- --    02/21/25 1700 98 °F (36.7 °C) 108 28 149/79 122 92  % -- -- -- -- -- -- -- -- -- --    02/21/25 1630 -- 108 22 119/74 87 92 % -- -- -- -- -- -- -- -- -- --    02/21/25 1600 -- 108 22 118/93 104 92 % -- -- -- -- -- -- -- -- -- No Pain    02/21/25 1500 -- 98 20 114/63 76 95 % -- -- -- -- -- -- -- -- -- --    02/21/25 1400 -- 114 27 139/80 101 96 % -- -- -- -- -- -- -- -- -- --    02/21/25 1320 -- -- -- -- -- -- -- -- -- -- -- -- -- 15 -- No Pain    02/21/25 1300 -- 110 25 109/72 90 98 % -- -- -- -- BiPAP -- Sitting -- -- --    02/21/25 1230 -- 108 22 115/82 95 99 % -- -- -- -- BiPAP -- Sitting -- -- --    02/21/25 1215 -- 108 19 120/76 93 100 % -- -- -- -- BiPAP -- Sitting -- -- --    02/21/25 1200 -- 110 20 115/75 96 99 % -- -- -- -- BiPAP -- Sitting -- -- --    02/21/25 1145 -- 110 23 126/79 90 100 % -- -- -- -- BiPAP -- Sitting -- -- --    02/21/25 1130 -- 112 18 121/72 93 100 % -- -- -- -- BiPAP -- Sitting -- -- --    02/21/25 1115 -- 112 26 112/88 101 100 % -- -- -- -- BiPAP -- Sitting -- -- --    02/21/25 1100 -- 114 24 136/66 93 99 % -- -- -- -- BiPAP -- Sitting -- -- --    02/21/25 1045 -- 114 21 136/66 -- 99 % -- -- -- -- BiPAP -- Sitting -- -- --    02/21/25 1030 -- 116 23 140/68 94 98 % -- -- -- -- BiPAP -- Sitting -- -- --    02/21/25 1015 -- 118 21 101/73 89 99 % -- -- -- -- -- -- -- -- -- --    02/21/25 1000 -- 120 26 122/72 82 100 % -- -- -- -- BiPAP -- Sitting -- -- --    02/21/25 0945 -- 124 23 113/68 75 98 % -- -- -- -- BiPAP -- Sitting -- -- --    02/21/25 0930 -- 126 24 97/63 82 98 % -- -- -- -- BiPAP -- Sitting -- -- --    02/21/25 0920 -- -- -- 78/64 -- -- -- -- -- -- -- -- -- -- -- --    02/21/25 0913 97.6 °F (36.4 °C) 128 24 97/67 -- 98 % -- -- -- -- BiPAP -- Sitting -- -- --    02/21/25 0902 -- -- -- -- -- 99 % -- -- -- -- -- Full face mask -- -- -- --    02/21/25 0851 -- 138 40 109/89 -- 96 % -- -- 6 L/min -- Simple mask -- Lying -- -- --    02/21/25 0845 -- -- -- -- -- -- -- -- -- -- -- -- -- 15 -- --    02/21/25 0844 -- -- -- -- -- 81 % --  -- -- -- -- -- -- -- -- --           CIWA-Ar Score       Row Name 02/22/25 0800 02/21/25 1800          CIWA-Ar    Nausea and Vomiting 0 0     Tactile Disturbances 0 0     Tremor 0 0     Auditory Disturbances 0 0     Paroxysmal Sweats 0 0     Visual Disturbances 0 0     Anxiety 0 0     Headache, Fullness in Head 0 0     Agitation 0 0     Orientation and Clouding of Sensorium 0 0     CIWA-Ar Total 0 0                     Pertinent Labs/Diagnostic Test Results:   Radiology:  CT chest abdomen pelvis wo contrast   Final Interpretation by Atif Rea MD (02/21 1112)      Right basilar consolidation and advanced diffuse pulmonary centrilobular and groundglass opacities indicate pneumonia. Atypical pneumonia can have this appearance.      No acute findings in the abdomen or pelvis.         XR chest 1 view portable   Final Interpretation by Jani Johnson MD (02/21 0926)      Mild congestion suggested..           Cardiology:  ECG 12 lead   Final Result by Lee Live DO (02/21 2308)   Sinus tachycardia   Otherwise normal ECG   When compared with ECG of 21-Feb-2025 10:58,   No significant change was found   Confirmed by Lee Live ([id number]) on 2/21/2025 11:08:45 PM      ECG 12 lead   Final Result by Ton Arellano DO (02/21 1157)   Sinus tachycardia   Otherwise normal ECG   When compared with ECG of 21-Feb-2025 09:02, (unconfirmed)   No significant change was found   Confirmed by Ton Arellano ([id number]) on 2/21/2025 11:57:31 AM      ECG 12 lead   Final Result by Ton Arellano DO (02/21 1157)   Sinus tachycardia   Otherwise normal ECG   No previous ECGs available   Confirmed by Ton Arellano ([id number]) on 2/21/2025 11:57:30 AM        Results from last 7 days   Lab Units 02/21/25  0857   SARS-COV-2  Negative     Results from last 7 days   Lab Units 02/22/25  0518 02/21/25  0856   WBC Thousand/uL 10.17* 8.53   HEMOGLOBIN g/dL 11.6* 14.7   HEMATOCRIT % 32.0* 41.1   PLATELETS Thousands/uL 332 339   TOTAL  "NEUT ABS Thousands/µL  --  6.16   BANDS PCT % 2  --          Results from last 7 days   Lab Units 02/22/25  0518 02/21/25 2029 02/21/25  1534 02/21/25  0855   SODIUM mmol/L 135 132* 130* 127*   POTASSIUM mmol/L 3.0* 3.4* 3.2* 3.7   CHLORIDE mmol/L 94* 95* 95* 90*   CO2 mmol/L 27 20* 15* 13*   ANION GAP mmol/L 14* 17* 20* 24*   BUN mg/dL 104* 107* 117* 122*   CREATININE mg/dL 2.55* 4.03* 5.41* 7.00*   EGFR ml/min/1.73sq m 26 15 10 7   CALCIUM mg/dL 9.5 9.1 9.0 10.0   MAGNESIUM mg/dL 3.1*  --   --   --    PHOSPHORUS mg/dL 4.1  --   --   --      Results from last 7 days   Lab Units 02/22/25 0518 02/21/25  0855   AST U/L 31 38   ALT U/L 36 45   ALK PHOS U/L 21* 22*   TOTAL PROTEIN g/dL 7.5 8.9*   ALBUMIN g/dL 3.4* 2.1*   TOTAL BILIRUBIN mg/dL 0.68 0.74         Results from last 7 days   Lab Units 02/22/25 0518 02/21/25 2029 02/21/25  1534 02/21/25  0855   GLUCOSE RANDOM mg/dL 145* 186* 181* 147*             No results found for: \"BETA-HYDROXYBUTYRATE\"       Results from last 7 days   Lab Units 02/21/25  0855   PH LELE  7.189*   PCO2 LELE mm Hg 39.1*   PO2 LELE mm Hg 70.5*   HCO3 LELE mmol/L 14.6*   BASE EXC LELE mmol/L -12.9   O2 CONTENT LELE ml/dL 18.9   O2 HGB, VENOUS % 88.8*         Results from last 7 days   Lab Units 02/21/25  0855   CK TOTAL U/L 242     Results from last 7 days   Lab Units 02/21/25  1255 02/21/25  1059 02/21/25  0855   HS TNI 0HR ng/L  --   --  7   HS TNI 2HR ng/L  --  9  --    HSTNI D2 ng/L  --  2  --    HS TNI 4HR ng/L 9  --   --    HSTNI D4 ng/L 2  --   --          Results from last 7 days   Lab Units 02/21/25  0855   PROTIME seconds 15.9*   INR  1.26*   PTT seconds 30         Results from last 7 days   Lab Units 02/22/25  0518 02/21/25  0855   PROCALCITONIN ng/ml 2.20* 5.04*     Results from last 7 days   Lab Units 02/21/25  1130 02/21/25  0855   LACTIC ACID mmol/L 2.0 2.6*             Results from last 7 days   Lab Units 02/21/25  0855   BNP pg/mL 65                                     Results " from last 7 days   Lab Units 02/21/25  1007   CLARITY UA  Turbid   COLOR UA  Yellow   SPEC GRAV UA  1.016   PH UA  5.0   GLUCOSE UA mg/dl Negative   KETONES UA mg/dl Negative   BLOOD UA  Small*   PROTEIN UA mg/dl 70 (1+)*   NITRITE UA  Negative   BILIRUBIN UA  Negative   UROBILINOGEN UA (BE) mg/dl <2.0   LEUKOCYTES UA  Negative   WBC UA /hpf 2-4*   RBC UA /hpf 1-2   BACTERIA UA /hpf None Seen   EPITHELIAL CELLS WET PREP /hpf Occasional   CREATININE UR mg/dL 212.9   PROTEIN UR mg/dL 200.1   PROT/CREAT RATIO UR  0.9*     Results from last 7 days   Lab Units 02/21/25  1826 02/21/25  0857   STREP PNEUMONIAE ANTIGEN, URINE  Positive*  --    LEGIONELLA URINARY ANTIGEN  Negative  --    INFLUENZA A PCR   --  Positive*   INFLUENZA B PCR   --  Negative   RSV PCR   --  Negative                             Results from last 7 days   Lab Units 02/21/25  0907 02/21/25  0855   BLOOD CULTURE  Received in Microbiology Lab. Culture in Progress. Received in Microbiology Lab. Culture in Progress.                   Past Medical History:   Diagnosis Date    Asthma     COPD (chronic obstructive pulmonary disease) ([facility])      Present on Admission:   COPD (chronic obstructive pulmonary disease) ([facility])   Benign essential hypertension   Acute kidney injury ([facility])   Current mild episode of major depressive disorder ([facility])      Admitting Diagnosis: Acute respiratory failure ([facility]) [J96.00]  SOB (shortness of breath) [R06.02]  Influenza A [J10.1]  Acute kidney injury ([facility]) [N17.9]  RLL pneumonia [J18.9]  Age/Sex: 60 y.o. male    Network Utilization Review Department  ATTENTION: Please call with any questions or concerns to [phone] and carefully listen to the prompts so that you are directed to the right person. All voicemails are confidential.   For Discharge needs, contact Care Management DC Support Team at [phone] opt. [phone]  Send all requests for admission clinical reviews, approved or denied determinations and any other requests to  dedicated fax number below belonging to the campus where the patient is receiving treatment. List of dedicated fax numbers for the Facilities:  FACILITY NAME UR FAX NUMBER   ADMISSION DENIALS (Administrative/Medical Necessity) [phone]   DISCHARGE SUPPORT TEAM (NETWORK) [phone]   PARENT CHILD HEALTH (Maternity/NICU/Pediatrics) [phone]   [facility] [phone]   [facility] [phone]   [facility] [phone]   [facility] [phone]   Replaced by [facility] [phone]   [facility] [phone]   [facility] [phone]   [facility] [phone]   [facility] [phone]   [facility] [phone]   [facility] [phone]   [facility] [phone]

## 2025-02-22 NOTE — ASSESSMENT & PLAN NOTE
The patient's sodium concentration today is corrected to normal to 135 mill equivalents per liter.  Etiology likely related to his renal failure impairing ability to excrete water now that it is improving it is normalized so issue appears resolved.

## 2025-02-22 NOTE — ASSESSMENT & PLAN NOTE
Na 127  Suspect could be in the setting of dehydration and hydrochlorothiazide  Currently asymptomatic      Plan:  Improving on IVF.   Daily monitoring.

## 2025-02-22 NOTE — ASSESSMENT & PLAN NOTE
2019 Spirometry:  The FVC is reduced [3.34L (72%)].  The FEV1 is severely reduced [1.78L (49%)].  The FEV1/FVC is reduced [53%].   CT of chest 2/21/25: Right basilar consolidation and advanced diffuse pulmonary centrilobular and groundglass opacities indicate pneumonia. Atypical pneumonia can have this appearance   Home meds: Albuterol 2 puffs every 4 hours as needed, Advair 250-50 1 puff twice daily, Spiriva 2.5 mcg 1 puff twice daily, montelukast 10 mg daily and Flonase 50 mg daily  S/p in the ED azithromycin 500 mg IV , ceftriaxone 2 g and Solu-Medrol 125 mg by EMS    Plan:  To complete azithromycin 500 mg  for 3 days  Continue abx for strep pneumo  Scheduled nebs. Continue home breathing treatment  Started on IV Solu-Medrol 40 mg every 8 hours.  Likely can do every 12 hours today.  Respiratory protocol  Smoking cessation strongly recommended.

## 2025-02-22 NOTE — ASSESSMENT & PLAN NOTE
The patient had significant acute kidney injury on presentation with a creatinine of 7.  Baseline renal function is normal with a creatinine of 1.  He was hydrated as there was a suspicion of volume depletion and his creatinines improved dramatically and is 2.5 today.  He did have a Tinajero catheter placed and no hydronephrosis was seen on imaging on admission.  The patient is requesting Tinajero catheter removed so I do not see any reason why that cannot be done I will relay to the ICU team and they should just follow his voiding.    Continue supportive care  BMP a.m.    Will discontinue IV fluids with bicarb and follow.

## 2025-02-22 NOTE — ASSESSMENT & PLAN NOTE
Presented with hypotension, tachycardia and tachypnea  Was having fever for the 3 days ago  Lactic acid 2.6, post IV fluids 2.0  S.p 3 Lts bolus Isolyte in the ED   Suspected source: bacterial pneumonia v/s influenza A  With ADIEL due to Creatinine 7.0

## 2025-02-22 NOTE — ASSESSMENT & PLAN NOTE
Acidosis metabolic with increased anion gap and respiratory compensation   Suspect in the setting of kennedy lactic acid due to sepsis and ADIEL  Current clinically stable   Nephrology was consulted in the ED  Received 3 LT bolus of fluids in the ED    Plan:  Improving on Bicarb gtt.  Appreciate nephro's input.

## 2025-02-22 NOTE — ASSESSMENT & PLAN NOTE
It;s been controlled in the past  Home meds: lisinopril-hydrochlorothiazide 20-25 mg daily   Was hypotensive in the ED due to sepsis     Plan:  Hold home meds in the setting of elevated Cr.  Monitor BP   Consider labetalol of BP>180/110 mmHg and symptoms   Low sodium diet

## 2025-02-22 NOTE — PROGRESS NOTES
Progress Note - Nephrology   Name: Luis Antonio Quiñones 60 y.o. male I MRN: [id number]  Unit/Bed#: ICU 10 I Date of Admission: 2/21/2025   Date of Service: 2/22/2025 I Hospital Day: 1     Assessment & Plan  Acute kidney injury (HCC)    The patient had significant acute kidney injury on presentation with a creatinine of 7.  Baseline renal function is normal with a creatinine of 1.  He was hydrated as there was a suspicion of volume depletion and his creatinines improved dramatically and is 2.5 today.  He did have a Tinajero catheter placed and no hydronephrosis was seen on imaging on admission.  The patient is requesting Tinajero catheter removed so I do not see any reason why that cannot be done I will relay to the ICU team and they should just follow his voiding.    Continue supportive care  BMP a.m.    Will discontinue IV fluids with bicarb and follow.      Hyponatremia    The patient's sodium concentration today is corrected to normal to 135 mill equivalents per liter.  Etiology likely related to his renal failure impairing ability to excrete water now that it is improving it is normalized so issue appears resolved.  Hypercalcemia    Felt due to volume depletion calcium was normalized today to 9.5 total calcium.  Metabolic acidosis    The patient presented with significant gap metabolic acidosis.  Looking at the urinalysis at the time ketones were negative lactic acid was not that elevated.  With renal failure likely this was the cause.  With improvement of renal function anion gap is lower and bicarbonate is normalized.          Subjective     The patient is awake and alert says that he is feeling better he was receiving his nebulizer treatment and gets a little winded with activity.  Says he is going to start eating today as well.    No fever chills or rigors  No nausea vomit diarrhea abdominal pain  No chest pain does have a slight cough  Little dyspnea on exertion  Positive Tinajero catheter.      Objective :  Temp:  [97.3 °F  (36.3 °C)-99.2 °F (37.3 °C)] 99.2 °F (37.3 °C)  HR:  [] 106  BP: ()/(56-93) 150/89  Resp:  [13-29] 20  SpO2:  [90 %-100 %] 96 %  O2 Device: Nasal cannula  Nasal Cannula O2 Flow Rate (L/min):  [3 L/min-5 L/min] 3 L/min  FiO2 (%):  [60] 60    Current Weight: Weight - Scale: 80.7 kg (177 lb 14.6 oz)  First Weight: Weight - Scale: 81.1 kg (178 lb 12.7 oz)  I/O         02/20 0701  02/21 0700 02/21 0701  02/22 0700 02/22 0701  02/23 0700    I.V. (mL/kg)  3843.3 (47.6)     IV Piggyback  300     Total Intake(mL/kg)  4143.3 (51.3)     Urine (mL/kg/hr)  2450     Total Output  2450     Net  +1693.3                  Physical Exam  Constitutional:       General: He is not in acute distress.     Appearance: He is not toxic-appearing.   HENT:      Head: Normocephalic and atraumatic.      Mouth/Throat:      Mouth: Mucous membranes are dry.   Eyes:      Extraocular Movements: Extraocular movements intact.   Cardiovascular:      Rate and Rhythm: Regular rhythm. Tachycardia present.      Heart sounds:      No gallop.   Pulmonary:      Effort: No respiratory distress.      Breath sounds: Rhonchi present.   Abdominal:      General: Bowel sounds are normal.      Palpations: Abdomen is soft.      Tenderness: There is no abdominal tenderness.   Neurological:      Mental Status: He is alert and oriented to person, place, and time.   Psychiatric:         Mood and Affect: Mood normal.         Behavior: Behavior normal.         Medications:    Current Facility-Administered Medications:     albuterol (PROVENTIL HFA,VENTOLIN HFA) inhaler 2 puff, 2 puff, Inhalation, Q4H PRN, Sangeetha Hutchinson MD    ARIPiprazole (ABILIFY) tablet 5 mg, 5 mg, Oral, Daily, Sangeetha Hutchinson MD, 5 mg at 02/22/25 0818    azithromycin (ZITHROMAX) tablet 500 mg, 500 mg, Oral, Q24H, Rayo Hastings MD    DULoxetine (CYMBALTA) delayed release capsule 60 mg, 60 mg, Oral, Daily, Sangeetha Hutchinson MD, 60 mg at 02/22/25 0818    fluticasone-vilanterol 200-25 mcg/actuation 1 puff,  1 puff, Inhalation, Daily, Sangeetha Hutchinson MD, 1 puff at 02/22/25 0818    heparin (porcine) subcutaneous injection 5,000 Units, 5,000 Units, Subcutaneous, Q8H [facility], Rayo Hastings MD, 5,000 Units at 02/22/25 0818    ipratropium (ATROVENT) 0.02 % inhalation solution 0.5 mg, 0.5 mg, Nebulization, 4x Daily, Rayo Hastings MD, 0.5 mg at 02/22/25 0742    levalbuterol (XOPENEX) inhalation solution 1.25 mg, 1.25 mg, Nebulization, TID, Sangeetha Hutchinson MD, 1.25 mg at 02/22/25 0742    methylPREDNISolone sodium succinate (Solu-MEDROL) injection 40 mg, 40 mg, Intravenous, Q8H [facility], Sangeetha Hutchinson MD, 40 mg at 02/22/25 0528    mirtazapine (REMERON) tablet 30 mg, 30 mg, Oral, QPM, Sangeetha Hutchinson MD, 30 mg at 02/21/25 2228    montelukast (SINGULAIR) tablet 10 mg, 10 mg, Oral, Daily, Rayo Hastings MD, 10 mg at 02/22/25 0823    nicotine (NICODERM CQ) 14 mg/24hr TD 24 hr patch 14 mg, 14 mg, Transdermal, Q24H, Sangeetha Hutchinson MD    potassium chloride (Klor-Con M20) CR tablet 40 mEq, 40 mEq, Oral, TID With Meals, Rayo Hastings MD, 40 mEq at 02/22/25 0729    sodium bicarbonate 150 mEq in dextrose 5 % 1,000 mL infusion, 100 mL/hr, Intravenous, Continuous, Sangeetha Hutchinson MD, Last Rate: 100 mL/hr at 02/22/25 0312, 100 mL/hr at 02/22/25 0312    umeclidinium 62.5 mcg/actuation inhaler AEPB 1 puff, 1 puff, Inhalation, Daily, Sangeetha Hutchinson MD, 1 puff at 02/22/25 0818      Lab Results: I have reviewed the following results:  Results from last 7 days   Lab Units 02/22/25  0518 02/21/25 2029 02/21/25  1534 02/21/25  0856 02/21/25  0855   WBC Thousand/uL 10.17*  --   --  8.53  --    HEMOGLOBIN g/dL 11.6*  --   --  14.7  --    HEMATOCRIT % 32.0*  --   --  41.1  --    PLATELETS Thousands/uL 332  --   --  339  --    POTASSIUM mmol/L 3.0* 3.4* 3.2*  --  3.7   CHLORIDE mmol/L 94* 95* 95*  --  90*   CO2 mmol/L 27 20* 15*  --  13*   BUN mg/dL 104* 107* 117*  --  122*   CREATININE mg/dL 2.55* 4.03* 5.41*  --  7.00*   CALCIUM mg/dL 9.5 9.1 9.0  --  10.0   MAGNESIUM mg/dL 3.1*   "--   --   --   --    PHOSPHORUS mg/dL 4.1  --   --   --   --    ALBUMIN g/dL 3.4*  --   --   --  2.1*       Administrative Statements     Portions of the record may have been created with voice recognition software. Occasional wrong word or \"sound a like\" substitutions may have occurred due to the inherent limitations of voice recognition software. Read the chart carefully and recognize, using context, where substitutions have occurred.If you have any questions, please contact the dictating provider.  "

## 2025-02-22 NOTE — ASSESSMENT & PLAN NOTE
The patient presented with significant gap metabolic acidosis.  Looking at the urinalysis at the time ketones were negative lactic acid was not that elevated.  With renal failure likely this was the cause.  With improvement of renal function anion gap is lower and bicarbonate is normalized.

## 2025-02-22 NOTE — ASSESSMENT & PLAN NOTE
Presented with acute respiratory failure, requiring BiPAP  Patient having shortness of breath for the past 5 days, chills and severe cough.   Patient meeting sepsis in the setting of infection in the ED  CT chest 2/21/2025: Right basilar consolidation and advanced diffuse pulmonary centrilobular and groundglass opacities indicate pneumonia. Atypical pneumonia can have this appearance  CXR: mild congestion   CURB 65 score: 2 point   Legionella and strep pneumoniae antigen- strep pneumo positive     Plan:  To complete Azithromycin x 3 days- should also cover strep pneum.  Waiting blood cultures  Sputum culture and gram stain   Consider de-escalation or escalate Antibiotics after cultures results

## 2025-02-22 NOTE — ASSESSMENT & PLAN NOTE
Presented with acute respiratory failure, requiring BiPAP  Patient having shortness of breath for the past 5 days, chills and severe cough.   Patient meeting sepsis in the setting of infection in the ED  CT chest 2/21/2025: Right basilar consolidation and advanced diffuse pulmonary centrilobular and groundglass opacities indicate pneumonia. Atypical pneumonia can have this appearance  He has acute Influenza A, so it may be this    But, we are treating for bacterial pneumonia with Rocephin and he was critically ill.

## 2025-02-23 LAB
ANION GAP SERPL CALCULATED.3IONS-SCNC: 9 MMOL/L (ref 4–13)
BUN SERPL-MCNC: 83 MG/DL (ref 5–25)
CALCIUM SERPL-MCNC: 9.4 MG/DL (ref 8.4–10.2)
CHLORIDE SERPL-SCNC: 97 MMOL/L (ref 96–108)
CO2 SERPL-SCNC: 29 MMOL/L (ref 21–32)
CREAT SERPL-MCNC: 1.29 MG/DL (ref 0.6–1.3)
ERYTHROCYTE [DISTWIDTH] IN BLOOD BY AUTOMATED COUNT: 13 % (ref 11.6–15.1)
GFR SERPL CREATININE-BSD FRML MDRD: 60 ML/MIN/1.73SQ M
GLUCOSE SERPL-MCNC: 156 MG/DL (ref 65–140)
HCT VFR BLD AUTO: 31.9 % (ref 36.5–49.3)
HGB BLD-MCNC: 11.3 G/DL (ref 12–17)
MAGNESIUM SERPL-MCNC: 2.7 MG/DL (ref 1.9–2.7)
MCH RBC QN AUTO: 32.4 PG (ref 26.8–34.3)
MCHC RBC AUTO-ENTMCNC: 35.4 G/DL (ref 31.4–37.4)
MCV RBC AUTO: 91 FL (ref 82–98)
PHOSPHATE SERPL-MCNC: 3.1 MG/DL (ref 2.7–4.5)
PLATELET # BLD AUTO: 359 THOUSANDS/UL (ref 149–390)
PMV BLD AUTO: 8.9 FL (ref 8.9–12.7)
POTASSIUM SERPL-SCNC: 3.3 MMOL/L (ref 3.5–5.3)
RBC # BLD AUTO: 3.49 MILLION/UL (ref 3.88–5.62)
SODIUM SERPL-SCNC: 135 MMOL/L (ref 135–147)
WBC # BLD AUTO: 11.41 THOUSAND/UL (ref 4.31–10.16)

## 2025-02-23 PROCEDURE — 85027 COMPLETE CBC AUTOMATED: CPT

## 2025-02-23 PROCEDURE — 99232 SBSQ HOSP IP/OBS MODERATE 35: CPT | Performed by: HOSPITALIST

## 2025-02-23 PROCEDURE — 94760 N-INVAS EAR/PLS OXIMETRY 1: CPT

## 2025-02-23 PROCEDURE — 80048 BASIC METABOLIC PNL TOTAL CA: CPT

## 2025-02-23 PROCEDURE — 94640 AIRWAY INHALATION TREATMENT: CPT

## 2025-02-23 PROCEDURE — 84100 ASSAY OF PHOSPHORUS: CPT

## 2025-02-23 PROCEDURE — 99232 SBSQ HOSP IP/OBS MODERATE 35: CPT | Performed by: INTERNAL MEDICINE

## 2025-02-23 PROCEDURE — 83735 ASSAY OF MAGNESIUM: CPT

## 2025-02-23 RX ADMIN — FLUTICASONE FUROATE AND VILANTEROL TRIFENATATE 1 PUFF: 200; 25 POWDER RESPIRATORY (INHALATION) at 09:48

## 2025-02-23 RX ADMIN — LEVALBUTEROL HYDROCHLORIDE 1.25 MG: 1.25 SOLUTION RESPIRATORY (INHALATION) at 14:00

## 2025-02-23 RX ADMIN — CEFTRIAXONE 1000 MG: 1 INJECTION, POWDER, FOR SOLUTION INTRAMUSCULAR; INTRAVENOUS at 12:12

## 2025-02-23 RX ADMIN — HEPARIN SODIUM 5000 UNITS: 5000 INJECTION INTRAVENOUS; SUBCUTANEOUS at 15:00

## 2025-02-23 RX ADMIN — LEVALBUTEROL HYDROCHLORIDE 1.25 MG: 1.25 SOLUTION RESPIRATORY (INHALATION) at 07:13

## 2025-02-23 RX ADMIN — ARIPIPRAZOLE 5 MG: 10 TABLET ORAL at 09:47

## 2025-02-23 RX ADMIN — METHYLPREDNISOLONE SODIUM SUCCINATE 40 MG: 40 INJECTION, POWDER, FOR SOLUTION INTRAMUSCULAR; INTRAVENOUS at 21:14

## 2025-02-23 RX ADMIN — DULOXETINE HYDROCHLORIDE 60 MG: 60 CAPSULE, DELAYED RELEASE ORAL at 09:47

## 2025-02-23 RX ADMIN — NICOTINE 14 MG: 14 PATCH, EXTENDED RELEASE TRANSDERMAL at 15:30

## 2025-02-23 RX ADMIN — MIRTAZAPINE 30 MG: 15 TABLET, FILM COATED ORAL at 17:32

## 2025-02-23 RX ADMIN — HEPARIN SODIUM 5000 UNITS: 5000 INJECTION INTRAVENOUS; SUBCUTANEOUS at 05:44

## 2025-02-23 RX ADMIN — METHYLPREDNISOLONE SODIUM SUCCINATE 40 MG: 40 INJECTION, POWDER, FOR SOLUTION INTRAMUSCULAR; INTRAVENOUS at 09:58

## 2025-02-23 RX ADMIN — MONTELUKAST 10 MG: 10 TABLET, FILM COATED ORAL at 09:47

## 2025-02-23 RX ADMIN — HEPARIN SODIUM 5000 UNITS: 5000 INJECTION INTRAVENOUS; SUBCUTANEOUS at 21:14

## 2025-02-23 RX ADMIN — UMECLIDINIUM 1 PUFF: 62.5 AEROSOL, POWDER ORAL at 09:48

## 2025-02-23 NOTE — ASSESSMENT & PLAN NOTE
Due to influenza, possible pneumonia, and acute COPD exacerbation    He was initially in the ICU  Now moved to the medical floor    Currently on 3 liters oxygen and breathing comfortably

## 2025-02-23 NOTE — UTILIZATION REVIEW
NOTIFICATION OF INPATIENT ADMISSION   AUTHORIZATION REQUEST   SERVICING FACILITY:   [place] [address]  Tax ID: [id number]  NPI: [id number] ATTENDING PROVIDER:  Attending Name and NPI#: Abduolaye Raygoza  [[id number]]  Address: [address] [place] [address]  Phone: [phone]     ADMISSION INFORMATION:  Place of Service: Inpatient [facility]  Place of Service Code: 21  Inpatient Admission Date/Time: 2/21/25 12:13 PM  Discharge Date/Time: No discharge date for patient encounter.  Admitting Diagnosis Code/Description:  Acute respiratory failure (HCC) [J96.00]  SOB (shortness of breath) [R06.02]  Influenza A [J10.1]  Acute kidney injury (HCC) [N17.9]  RLL pneumonia [J18.9]     UTILIZATION REVIEW CONTACT:  Dianne Durham, Utilization   Network Utilization Review Department  Phone: [phone]  Fax [phone]  Email: [email or web address]  Contact for approvals/pending authorizations, clinical reviews, and discharge.     PHYSICIAN ADVISORY SERVICES:  Medical Necessity Denial & Oqpm-fn-Fmka Review  Phone: [phone]  Fax: [phone]  Email: [email or web address]     DISCHARGE SUPPORT TEAM:  For Patients Discharge Needs & Updates  Phone: [phone] opt. 2 Fax: [phone]  Email: [email or web address]

## 2025-02-23 NOTE — PROGRESS NOTES
Progress Note - Nephrology   Name: Luis Antonio Quiñones 60 y.o. male I MRN: [id number]  Unit/Bed#: E2 -01 I Date of Admission: 2/21/2025   Date of Service: 2/23/2025 I Hospital Day: 2     Assessment & Plan  Acute kidney injury (HCC)    Patient had acute renal failure presenting with a creatinine of 7.  With supportive care modest fluids creatinines declined back to 1.2 which is approximately baseline.  Tinajero catheter is out no problems with urination.  He is off IV fluids.    This issue is resolved.        Hyponatremia    Related to acute kidney injury resulting in reduction ability to excrete water loads issues resolved sodium concentration is 135 mill equivalents per liter today.      Metabolic acidosis    Metabolic acidosis resolved anion gap is normalized issue resolved.    We will sign off please call if we can be of further assistance.            Subjective     Patient stable clinically was resting when I went in the room with no acute changes or complaints was in no distress.      Objective :  Temp:  [97.5 °F (36.4 °C)-98.5 °F (36.9 °C)] 98.3 °F (36.8 °C)  HR:  [] 95  BP: (128-161)/(60-91) 153/78  Resp:  [14-27] 20  SpO2:  [91 %-98 %] 92 %  O2 Device: Other (comment)  Nasal Cannula O2 Flow Rate (L/min):  [2 L/min-3 L/min] 3 L/min    Current Weight: Weight - Scale: 80.7 kg (177 lb 14.6 oz)  First Weight: Weight - Scale: 81.1 kg (178 lb 12.7 oz)  I/O         02/21 0701  02/22 0700 02/22 0701  02/23 0700 02/23 0701  02/24 0700    P.O.  600     I.V. (mL/kg) 3843.3 (47.6)      IV Piggyback 300      Total Intake(mL/kg) 4143.3 (51.3) 600 (7.4)     Urine (mL/kg/hr) 2450 700 (0.4)     Total Output 2450 700     Net +1693.3 -100                  Physical Exam  Constitutional:       General: He is not in acute distress.     Appearance: He is not toxic-appearing.   HENT:      Head: Normocephalic and atraumatic.      Mouth/Throat:      Mouth: Mucous membranes are dry.   Eyes:      General: No scleral  icterus.  Cardiovascular:      Rate and Rhythm: Normal rate and regular rhythm.      Heart sounds:      No gallop.   Pulmonary:      Effort: Pulmonary effort is normal. No respiratory distress.   Abdominal:      General: Bowel sounds are normal. There is no distension.      Palpations: Abdomen is soft.         Medications:    Current Facility-Administered Medications:     albuterol (PROVENTIL HFA,VENTOLIN HFA) inhaler 2 puff, 2 puff, Inhalation, Q4H PRN, Rayo Hastings MD, 2 puff at 02/22/25 2301    ARIPiprazole (ABILIFY) tablet 5 mg, 5 mg, Oral, Daily, Rayo Hastings MD, 5 mg at 02/22/25 0818    cefTRIAXone (ROCEPHIN) 1,000 mg in dextrose 5 % 50 mL IVPB, 1,000 mg, Intravenous, Q24H, Rayo Hastings MD, Last Rate: 100 mL/hr at 02/22/25 1154, 1,000 mg at 02/22/25 1154    DULoxetine (CYMBALTA) delayed release capsule 60 mg, 60 mg, Oral, Daily, Rayo Hastings MD, 60 mg at 02/22/25 0818    fluticasone-vilanterol 200-25 mcg/actuation 1 puff, 1 puff, Inhalation, Daily, Rayo Hastings MD, 1 puff at 02/22/25 0818    heparin (porcine) subcutaneous injection 5,000 Units, 5,000 Units, Subcutaneous, Q8H [facility], Rayo Hastings MD, 5,000 Units at 02/23/25 0544    [Transfer Hold] ipratropium (ATROVENT) 0.02 % inhalation solution 0.5 mg, 0.5 mg, Nebulization, TID, Octavio Rivera,     levalbuterol (XOPENEX) inhalation solution 1.25 mg, 1.25 mg, Nebulization, TID, Rayo Hastings MD, 1.25 mg at 02/23/25 0713    methylPREDNISolone sodium succinate (Solu-MEDROL) injection 40 mg, 40 mg, Intravenous, Q12H [facility], Rayo Hastings MD, 40 mg at 02/22/25 2153    mirtazapine (REMERON) tablet 30 mg, 30 mg, Oral, QPM, Rayo Hastings MD, 30 mg at 02/22/25 1805    montelukast (SINGULAIR) tablet 10 mg, 10 mg, Oral, Daily, Rayo Hastings MD, 10 mg at 02/22/25 0823    nicotine (NICODERM CQ) 14 mg/24hr TD 24 hr patch 14 mg, 14 mg, Transdermal, Q24H, Rayo Hastings MD    umeclidinium 62.5 mcg/actuation inhaler AEPB 1 puff, 1 puff, Inhalation, Daily, Rayo Hastings MD, 1 puff at 02/22/25 0818      Lab Results: I have  "reviewed the following results:  Results from last 7 days   Lab Units 02/23/25  0545 02/22/25  0518 02/21/25 2029 02/21/25  1534 02/21/25  0856 02/21/25  0855   WBC Thousand/uL 11.41* 10.17*  --   --  8.53  --    HEMOGLOBIN g/dL 11.3* 11.6*  --   --  14.7  --    HEMATOCRIT % 31.9* 32.0*  --   --  41.1  --    PLATELETS Thousands/uL 359 332  --   --  339  --    POTASSIUM mmol/L 3.3* 3.0* 3.4* 3.2*  --  3.7   CHLORIDE mmol/L 97 94* 95* 95*  --  90*   CO2 mmol/L 29 27 20* 15*  --  13*   BUN mg/dL 83* 104* 107* 117*  --  122*   CREATININE mg/dL 1.29 2.55* 4.03* 5.41*  --  7.00*   CALCIUM mg/dL 9.4 9.5 9.1 9.0  --  10.0   MAGNESIUM mg/dL 2.7 3.1*  --   --   --   --    PHOSPHORUS mg/dL 3.1 4.1  --   --   --   --    ALBUMIN g/dL  --  3.4*  --   --   --  2.1*       Administrative Statements     Portions of the record may have been created with voice recognition software. Occasional wrong word or \"sound a like\" substitutions may have occurred due to the inherent limitations of voice recognition software. Read the chart carefully and recognize, using context, where substitutions have occurred.If you have any questions, please contact the dictating provider.  "

## 2025-02-23 NOTE — PLAN OF CARE
Problem: PAIN - ADULT  Goal: Verbalizes/displays adequate comfort level or baseline comfort level  Description: Interventions:  - Encourage patient to monitor pain and request assistance  - Assess pain using appropriate pain scale  - Administer analgesics based on type and severity of pain and evaluate response  - Implement non-pharmacological measures as appropriate and evaluate response  - Consider cultural and social influences on pain and pain management  - Notify physician/advanced practitioner if interventions unsuccessful or patient reports new pain  Outcome: Progressing     Problem: INFECTION - ADULT  Goal: Absence or prevention of progression during hospitalization  Description: INTERVENTIONS:  - Assess and monitor for signs and symptoms of infection  - Monitor lab/diagnostic results  - Monitor all insertion sites, i.e. indwelling lines, tubes, and drains  - Monitor endotracheal if appropriate and nasal secretions for changes in amount and color  - Whately appropriate cooling/warming therapies per order  - Administer medications as ordered  - Instruct and encourage patient and family to use good hand hygiene technique  - Identify and instruct in appropriate isolation precautions for identified infection/condition  Outcome: Progressing  Goal: Absence of fever/infection during neutropenic period  Description: INTERVENTIONS:  - Monitor WBC    Outcome: Progressing     Problem: DISCHARGE PLANNING  Goal: Discharge to home or other facility with appropriate resources  Description: INTERVENTIONS:  - Identify barriers to discharge w/patient and caregiver  - Arrange for needed discharge resources and transportation as appropriate  - Identify discharge learning needs (meds, wound care, etc.)  - Arrange for interpretive services to assist at discharge as needed  - Refer to Case Management Department for coordinating discharge planning if the patient needs post-hospital services based on physician/advanced  practitioner order or complex needs related to functional status, cognitive ability, or social support system  Outcome: Progressing     Problem: Knowledge Deficit  Goal: Patient/family/caregiver demonstrates understanding of disease process, treatment plan, medications, and discharge instructions  Description: Complete learning assessment and assess knowledge base.  Interventions:  - Provide teaching at level of understanding  - Provide teaching via preferred learning methods  Outcome: Progressing     Problem: RESPIRATORY - ADULT  Goal: Achieves optimal ventilation and oxygenation  Description: INTERVENTIONS:  - Assess for changes in respiratory status  - Assess for changes in mentation and behavior  - Position to facilitate oxygenation and minimize respiratory effort  - Oxygen administered by appropriate delivery if ordered  - Initiate smoking cessation education as indicated  - Encourage broncho-pulmonary hygiene including cough, deep breathe, Incentive Spirometry  - Assess the need for suctioning and aspirate as needed  - Assess and instruct to report SOB or any respiratory difficulty  - Respiratory Therapy support as indicated  Outcome: Progressing     Problem: Prexisting or High Potential for Compromised Skin Integrity  Goal: Skin integrity is maintained or improved  Description: INTERVENTIONS:  - Identify patients at risk for skin breakdown  - Assess and monitor skin integrity  - Assess and monitor nutrition and hydration status  - Monitor labs   - Assess for incontinence   - Turn and reposition patient  - Assist with mobility/ambulation  - Relieve pressure over bony prominences  - Avoid friction and shearing  - Provide appropriate hygiene as needed including keeping skin clean and dry  - Evaluate need for skin moisturizer/barrier cream  - Collaborate with interdisciplinary team   - Patient/family teaching  - Consider wound care consult   Outcome: Progressing

## 2025-02-23 NOTE — PROGRESS NOTES
Progress Note - Hospitalist   Name: Luis Antonio Quiñones 60 y.o. male I MRN: [id number]  Unit/Bed#: E2 -01 I Date of Admission: 2025   Date of Service: 2025 I Hospital Day: 2    Assessment & Plan  Acute respiratory failure (HCC)  Due to influenza, possible pneumonia, and acute COPD exacerbation    He was initially in the ICU  Now moved to the medical floor    Currently on 3 liters oxygen and breathing comfortably  Pneumonia  Presented with acute respiratory failure, requiring BiPAP  Patient having shortness of breath for the past 5 days, chills and severe cough.   Patient meeting sepsis in the setting of infection in the ED  CT chest 2025: Right basilar consolidation and advanced diffuse pulmonary centrilobular and groundglass opacities indicate pneumonia. Atypical pneumonia can have this appearance  He has acute Influenza A, so it may be this    But, we are treating for bacterial pneumonia with Rocephin and he was critically ill.  Benign essential hypertension    COPD (chronic obstructive pulmonary disease) (HCC)  Having an acute COPD exacerbation  Improving  Weaning steroids  Current mild episode of major depressive disorder (HCC)    Acute kidney injury (HCC)    Hyponatremia    Disorder of acid-base balance    Hypercalcemia    Sepsis with acute organ dysfunction (HCC)    Flu A  Presented outside the window for Tamiflu    Metabolic acidosis      VTE Pharmacologic Prophylaxis:                 Current Length of Stay: 2 day(s)  Current Patient Status: Inpatient   Certification Statement:     Discharge Plan: home when off of oxygen.  Likely mid week        Subjective    Doing better  Less sob  No cough  .    Objective   Vitals:   Temp (24hrs), Av.8 °F (36.6 °C), Min:97.5 °F (36.4 °C), Max:98.3 °F (36.8 °C)    Temp:  [97.5 °F (36.4 °C)-98.3 °F (36.8 °C)] 97.7 °F (36.5 °C)  HR:  [] 111  Resp:  [18-20] 20  BP: (114-153)/(60-78) 114/66  SpO2:  [89 %-98 %] 95 %  Body mass index is 25.53 kg/m².          Physical Exam  Vitals and nursing note reviewed.   Constitutional:       General: He is not in acute distress.     Appearance: He is well-developed.   HENT:      Head: Normocephalic and atraumatic.   Eyes:      Conjunctiva/sclera: Conjunctivae normal.   Cardiovascular:      Rate and Rhythm: Normal rate and regular rhythm.      Heart sounds: No murmur heard.  Pulmonary:      Effort: Pulmonary effort is normal. No respiratory distress.      Breath sounds: Normal breath sounds.   Abdominal:      Palpations: Abdomen is soft.      Tenderness: There is no abdominal tenderness.   Musculoskeletal:         General: No swelling.      Cervical back: Neck supple.      Right lower leg: No edema.      Left lower leg: No edema.   Skin:     General: Skin is warm and dry.      Capillary Refill: Capillary refill takes less than 2 seconds.   Neurological:      Mental Status: He is alert.   Psychiatric:         Mood and Affect: Mood normal.           Lab results  Results from last 7 days   Lab Units 02/23/25  0545 02/22/25  0518 02/21/25  0856 02/21/25  0855   WBC Thousand/uL 11.41* 10.17* 8.53  --    HEMOGLOBIN g/dL 11.3* 11.6* 14.7  --    PLATELETS Thousands/uL 359 332 339  --    MCV fL 91 87 88  --    TOTAL NEUT ABS Thousand/uL  --  8.14*  --   --    BANDS PCT %  --  2  --   --    INR   --   --   --  1.26*     Results from last 7 days   Lab Units 02/23/25  0545 02/22/25  0518 02/21/25  2029 02/21/25  1534 02/21/25  0855   SODIUM mmol/L 135 135 132*   < > 127*   POTASSIUM mmol/L 3.3* 3.0* 3.4*   < > 3.7   CHLORIDE mmol/L 97 94* 95*   < > 90*   CO2 mmol/L 29 27 20*   < > 13*   ANION GAP mmol/L 9 14* 17*   < > 24*   BUN mg/dL 83* 104* 107*   < > 122*   CREATININE mg/dL 1.29 2.55* 4.03*   < > 7.00*   CALCIUM mg/dL 9.4 9.5 9.1   < > 10.0   ALBUMIN g/dL  --  3.4*  --   --  2.1*   TOTAL BILIRUBIN mg/dL  --  0.68  --   --  0.74   ALK PHOS U/L  --  21*  --   --  22*   ALT U/L  --  36  --   --  45   AST U/L  --  31  --   --  38   EGFR  ml/min/1.73sq m 60 26 15   < > 7   GLUCOSE RANDOM mg/dL 156* 145* 186*   < > 147*    < > = values in this interval not displayed.     Results from last 7 days   Lab Units 02/23/25  0545 02/22/25  0518   MAGNESIUM mg/dL 2.7 3.1*   PHOSPHORUS mg/dL 3.1 4.1     Results from last 7 days   Lab Units 02/21/25  0855   CK TOTAL U/L 242         Last 24 Hours Medication List:     Current Facility-Administered Medications:     albuterol (PROVENTIL HFA,VENTOLIN HFA) inhaler 2 puff, Q4H PRN    ARIPiprazole (ABILIFY) tablet 5 mg, Daily    cefTRIAXone (ROCEPHIN) 1,000 mg in dextrose 5 % 50 mL IVPB, Q24H, Last Rate: 1,000 mg (02/23/25 1212)    DULoxetine (CYMBALTA) delayed release capsule 60 mg, Daily    fluticasone-vilanterol 200-25 mcg/actuation 1 puff, Daily    heparin (porcine) subcutaneous injection 5,000 Units, Q8H AMARILIS    [Transfer Hold] ipratropium (ATROVENT) 0.02 % inhalation solution 0.5 mg, TID    levalbuterol (XOPENEX) inhalation solution 1.25 mg, TID    methylPREDNISolone sodium succinate (Solu-MEDROL) injection 40 mg, Q12H AMARILIS    mirtazapine (REMERON) tablet 30 mg, QPM    montelukast (SINGULAIR) tablet 10 mg, Daily    nicotine (NICODERM CQ) 14 mg/24hr TD 24 hr patch 14 mg, Q24H    umeclidinium 62.5 mcg/actuation inhaler AEPB 1 puff, Daily

## 2025-02-23 NOTE — PLAN OF CARE
Problem: PAIN - ADULT  Goal: Verbalizes/displays adequate comfort level or baseline comfort level  Description: Interventions:  - Encourage patient to monitor pain and request assistance  - Assess pain using appropriate pain scale  - Administer analgesics based on type and severity of pain and evaluate response  - Implement non-pharmacological measures as appropriate and evaluate response  - Consider cultural and social influences on pain and pain management  - Notify physician/advanced practitioner if interventions unsuccessful or patient reports new pain  Outcome: Progressing     Problem: INFECTION - ADULT  Goal: Absence or prevention of progression during hospitalization  Description: INTERVENTIONS:  - Assess and monitor for signs and symptoms of infection  - Monitor lab/diagnostic results  - Monitor all insertion sites, i.e. indwelling lines, tubes, and drains  - Monitor endotracheal if appropriate and nasal secretions for changes in amount and color  - Shushan appropriate cooling/warming therapies per order  - Administer medications as ordered  - Instruct and encourage patient and family to use good hand hygiene technique  - Identify and instruct in appropriate isolation precautions for identified infection/condition  Outcome: Progressing  Goal: Absence of fever/infection during neutropenic period  Description: INTERVENTIONS:  - Monitor WBC    Outcome: Progressing     Problem: DISCHARGE PLANNING  Goal: Discharge to home or other facility with appropriate resources  Description: INTERVENTIONS:  - Identify barriers to discharge w/patient and caregiver  - Arrange for needed discharge resources and transportation as appropriate  - Identify discharge learning needs (meds, wound care, etc.)  - Arrange for interpretive services to assist at discharge as needed  - Refer to Case Management Department for coordinating discharge planning if the patient needs post-hospital services based on physician/advanced  practitioner order or complex needs related to functional status, cognitive ability, or social support system  Outcome: Progressing     Problem: Knowledge Deficit  Goal: Patient/family/caregiver demonstrates understanding of disease process, treatment plan, medications, and discharge instructions  Description: Complete learning assessment and assess knowledge base.  Interventions:  - Provide teaching at level of understanding  - Provide teaching via preferred learning methods  Outcome: Progressing     Problem: RESPIRATORY - ADULT  Goal: Achieves optimal ventilation and oxygenation  Description: INTERVENTIONS:  - Assess for changes in respiratory status  - Assess for changes in mentation and behavior  - Position to facilitate oxygenation and minimize respiratory effort  - Oxygen administered by appropriate delivery if ordered  - Initiate smoking cessation education as indicated  - Encourage broncho-pulmonary hygiene including cough, deep breathe, Incentive Spirometry  - Assess the need for suctioning and aspirate as needed  - Assess and instruct to report SOB or any respiratory difficulty  - Respiratory Therapy support as indicated  Outcome: Progressing     Problem: Prexisting or High Potential for Compromised Skin Integrity  Goal: Skin integrity is maintained or improved  Description: INTERVENTIONS:  - Identify patients at risk for skin breakdown  - Assess and monitor skin integrity  - Assess and monitor nutrition and hydration status  - Monitor labs   - Assess for incontinence   - Turn and reposition patient  - Assist with mobility/ambulation  - Relieve pressure over bony prominences  - Avoid friction and shearing  - Provide appropriate hygiene as needed including keeping skin clean and dry  - Evaluate need for skin moisturizer/barrier cream  - Collaborate with interdisciplinary team   - Patient/family teaching  - Consider wound care consult   Outcome: Progressing

## 2025-02-23 NOTE — ASSESSMENT & PLAN NOTE
Related to acute kidney injury resulting in reduction ability to excrete water loads issues resolved sodium concentration is 135 mill equivalents per liter today.

## 2025-02-23 NOTE — ASSESSMENT & PLAN NOTE
Metabolic acidosis resolved anion gap is normalized issue resolved.    We will sign off please call if we can be of further assistance.

## 2025-02-23 NOTE — ASSESSMENT & PLAN NOTE
Patient had acute renal failure presenting with a creatinine of 7.  With supportive care modest fluids creatinines declined back to 1.2 which is approximately baseline.  Tinajero catheter is out no problems with urination.  He is off IV fluids.    This issue is resolved.

## 2025-02-24 PROBLEM — J96.01 ACUTE RESPIRATORY FAILURE WITH HYPOXIA (HCC): Status: ACTIVE | Noted: 2025-02-21

## 2025-02-24 PROBLEM — E87.20 METABOLIC ACIDOSIS: Status: RESOLVED | Noted: 2025-02-22 | Resolved: 2025-02-24

## 2025-02-24 PROBLEM — D64.9 ANEMIA: Status: ACTIVE | Noted: 2025-02-24

## 2025-02-24 PROBLEM — R06.01 ORTHOPNEA: Status: ACTIVE | Noted: 2025-02-24

## 2025-02-24 PROBLEM — E83.52 HYPERCALCEMIA: Status: RESOLVED | Noted: 2025-02-21 | Resolved: 2025-02-24

## 2025-02-24 LAB
ANION GAP SERPL CALCULATED.3IONS-SCNC: 8 MMOL/L (ref 4–13)
ANISOCYTOSIS BLD QL SMEAR: PRESENT
BASOPHILS # BLD MANUAL: 0 THOUSAND/UL (ref 0–0.1)
BASOPHILS NFR MAR MANUAL: 0 % (ref 0–1)
BUN SERPL-MCNC: 42 MG/DL (ref 5–25)
CALCIUM SERPL-MCNC: 9.1 MG/DL (ref 8.4–10.2)
CHLORIDE SERPL-SCNC: 97 MMOL/L (ref 96–108)
CO2 SERPL-SCNC: 31 MMOL/L (ref 21–32)
CREAT SERPL-MCNC: 0.91 MG/DL (ref 0.6–1.3)
DACRYOCYTES BLD QL SMEAR: PRESENT
EOSINOPHIL # BLD MANUAL: 0 THOUSAND/UL (ref 0–0.4)
EOSINOPHIL NFR BLD MANUAL: 0 % (ref 0–6)
ERYTHROCYTE [DISTWIDTH] IN BLOOD BY AUTOMATED COUNT: 13.4 % (ref 11.6–15.1)
GFR SERPL CREATININE-BSD FRML MDRD: 92 ML/MIN/1.73SQ M
GLUCOSE SERPL-MCNC: 195 MG/DL (ref 65–140)
HCT VFR BLD AUTO: 31.6 % (ref 36.5–49.3)
HGB BLD-MCNC: 10.8 G/DL (ref 12–17)
HYPERCHROMIA BLD QL SMEAR: PRESENT
LYMPHOCYTES # BLD AUTO: 0.63 THOUSAND/UL (ref 0.6–4.47)
LYMPHOCYTES # BLD AUTO: 6 % (ref 14–44)
MCH RBC QN AUTO: 32 PG (ref 26.8–34.3)
MCHC RBC AUTO-ENTMCNC: 34.2 G/DL (ref 31.4–37.4)
MCV RBC AUTO: 94 FL (ref 82–98)
MICROCYTES BLD QL AUTO: PRESENT
MONOCYTES # BLD AUTO: 0.95 THOUSAND/UL (ref 0–1.22)
MONOCYTES NFR BLD: 9 % (ref 4–12)
MYELOCYTE ABSOLUTE CT: 0.21 THOUSAND/UL (ref 0–0.1)
MYELOCYTES NFR BLD MANUAL: 2 % (ref 0–1)
NEUTROPHILS # BLD MANUAL: 8.76 THOUSAND/UL (ref 1.85–7.62)
NEUTS BAND NFR BLD MANUAL: 1 % (ref 0–8)
NEUTS SEG NFR BLD AUTO: 82 % (ref 43–75)
PLATELET # BLD AUTO: 316 THOUSANDS/UL (ref 149–390)
PLATELET BLD QL SMEAR: ADEQUATE
PMV BLD AUTO: 8.8 FL (ref 8.9–12.7)
POTASSIUM SERPL-SCNC: 3.4 MMOL/L (ref 3.5–5.3)
RBC # BLD AUTO: 3.38 MILLION/UL (ref 3.88–5.62)
RBC MORPH BLD: PRESENT
SODIUM SERPL-SCNC: 136 MMOL/L (ref 135–147)
WBC # BLD AUTO: 10.56 THOUSAND/UL (ref 4.31–10.16)

## 2025-02-24 PROCEDURE — 94640 AIRWAY INHALATION TREATMENT: CPT

## 2025-02-24 PROCEDURE — 94760 N-INVAS EAR/PLS OXIMETRY 1: CPT

## 2025-02-24 PROCEDURE — 85007 BL SMEAR W/DIFF WBC COUNT: CPT | Performed by: INTERNAL MEDICINE

## 2025-02-24 PROCEDURE — 99232 SBSQ HOSP IP/OBS MODERATE 35: CPT | Performed by: INTERNAL MEDICINE

## 2025-02-24 PROCEDURE — 80048 BASIC METABOLIC PNL TOTAL CA: CPT | Performed by: INTERNAL MEDICINE

## 2025-02-24 PROCEDURE — 85027 COMPLETE CBC AUTOMATED: CPT | Performed by: INTERNAL MEDICINE

## 2025-02-24 RX ORDER — MIRTAZAPINE 15 MG/1
30 TABLET, FILM COATED ORAL
Status: DISCONTINUED | OUTPATIENT
Start: 2025-02-24 | End: 2025-02-26 | Stop reason: HOSPADM

## 2025-02-24 RX ORDER — POTASSIUM CHLORIDE 1500 MG/1
40 TABLET, EXTENDED RELEASE ORAL ONCE
Status: COMPLETED | OUTPATIENT
Start: 2025-02-24 | End: 2025-02-24

## 2025-02-24 RX ORDER — ACETAMINOPHEN 325 MG/1
650 TABLET ORAL EVERY 4 HOURS PRN
Status: DISCONTINUED | OUTPATIENT
Start: 2025-02-24 | End: 2025-02-26 | Stop reason: HOSPADM

## 2025-02-24 RX ORDER — ONDANSETRON 2 MG/ML
4 INJECTION INTRAMUSCULAR; INTRAVENOUS EVERY 4 HOURS PRN
Status: DISCONTINUED | OUTPATIENT
Start: 2025-02-24 | End: 2025-02-26 | Stop reason: HOSPADM

## 2025-02-24 RX ORDER — LEVALBUTEROL INHALATION SOLUTION 1.25 MG/3ML
1.25 SOLUTION RESPIRATORY (INHALATION)
Status: DISCONTINUED | OUTPATIENT
Start: 2025-02-24 | End: 2025-02-26 | Stop reason: HOSPADM

## 2025-02-24 RX ORDER — GUAIFENESIN/DEXTROMETHORPHAN 100-10MG/5
10 SYRUP ORAL EVERY 4 HOURS PRN
Status: DISCONTINUED | OUTPATIENT
Start: 2025-02-24 | End: 2025-02-26 | Stop reason: HOSPADM

## 2025-02-24 RX ORDER — MIRTAZAPINE 15 MG/1
30 TABLET, FILM COATED ORAL
Status: DISCONTINUED | OUTPATIENT
Start: 2025-02-25 | End: 2025-02-24

## 2025-02-24 RX ORDER — BENZONATATE 100 MG/1
100 CAPSULE ORAL 3 TIMES DAILY
Status: DISCONTINUED | OUTPATIENT
Start: 2025-02-24 | End: 2025-02-26 | Stop reason: HOSPADM

## 2025-02-24 RX ORDER — METHYLPREDNISOLONE SODIUM SUCCINATE 40 MG/ML
20 INJECTION, POWDER, LYOPHILIZED, FOR SOLUTION INTRAMUSCULAR; INTRAVENOUS EVERY 12 HOURS SCHEDULED
Status: DISCONTINUED | OUTPATIENT
Start: 2025-02-24 | End: 2025-02-25

## 2025-02-24 RX ADMIN — ARIPIPRAZOLE 5 MG: 10 TABLET ORAL at 09:18

## 2025-02-24 RX ADMIN — HEPARIN SODIUM 5000 UNITS: 5000 INJECTION INTRAVENOUS; SUBCUTANEOUS at 21:10

## 2025-02-24 RX ADMIN — MONTELUKAST 10 MG: 10 TABLET, FILM COATED ORAL at 09:18

## 2025-02-24 RX ADMIN — METHYLPREDNISOLONE SODIUM SUCCINATE 20 MG: 40 INJECTION, POWDER, FOR SOLUTION INTRAMUSCULAR; INTRAVENOUS at 21:10

## 2025-02-24 RX ADMIN — GUAIFENESIN AND DEXTROMETHORPHAN 10 ML: 20; 200 SYRUP ORAL at 21:28

## 2025-02-24 RX ADMIN — BENZONATATE 100 MG: 100 CAPSULE ORAL at 16:04

## 2025-02-24 RX ADMIN — POTASSIUM CHLORIDE 40 MEQ: 1500 TABLET, EXTENDED RELEASE ORAL at 12:26

## 2025-02-24 RX ADMIN — HEPARIN SODIUM 5000 UNITS: 5000 INJECTION INTRAVENOUS; SUBCUTANEOUS at 05:17

## 2025-02-24 RX ADMIN — METHYLPREDNISOLONE SODIUM SUCCINATE 40 MG: 40 INJECTION, POWDER, FOR SOLUTION INTRAMUSCULAR; INTRAVENOUS at 09:18

## 2025-02-24 RX ADMIN — BENZONATATE 100 MG: 100 CAPSULE ORAL at 10:50

## 2025-02-24 RX ADMIN — LEVALBUTEROL HYDROCHLORIDE 1.25 MG: 1.25 SOLUTION RESPIRATORY (INHALATION) at 07:08

## 2025-02-24 RX ADMIN — CEFTRIAXONE 1000 MG: 1 INJECTION, POWDER, FOR SOLUTION INTRAMUSCULAR; INTRAVENOUS at 10:38

## 2025-02-24 RX ADMIN — UMECLIDINIUM 1 PUFF: 62.5 AEROSOL, POWDER ORAL at 09:20

## 2025-02-24 RX ADMIN — LEVALBUTEROL HYDROCHLORIDE 1.25 MG: 1.25 SOLUTION RESPIRATORY (INHALATION) at 13:18

## 2025-02-24 RX ADMIN — BENZONATATE 100 MG: 100 CAPSULE ORAL at 21:10

## 2025-02-24 RX ADMIN — HEPARIN SODIUM 5000 UNITS: 5000 INJECTION INTRAVENOUS; SUBCUTANEOUS at 16:04

## 2025-02-24 RX ADMIN — DULOXETINE HYDROCHLORIDE 60 MG: 60 CAPSULE, DELAYED RELEASE ORAL at 09:18

## 2025-02-24 RX ADMIN — MIRTAZAPINE 30 MG: 15 TABLET, FILM COATED ORAL at 21:50

## 2025-02-24 RX ADMIN — FLUTICASONE FUROATE AND VILANTEROL TRIFENATATE 1 PUFF: 200; 25 POWDER RESPIRATORY (INHALATION) at 08:00

## 2025-02-24 RX ADMIN — GUAIFENESIN AND DEXTROMETHORPHAN 10 ML: 20; 200 SYRUP ORAL at 17:21

## 2025-02-24 NOTE — ASSESSMENT & PLAN NOTE
Patient was admitted to the ICU and met criteria for sepsis, present on admission, with hypotension, tachypnea, and tachycardia: Secondary to pneumonia  Initial lactic acid 2.6  Was given IV fluids, and antibiotics with improvement

## 2025-02-24 NOTE — ASSESSMENT & PLAN NOTE
Home medications include lisinopril-HCTZ 20/25 mg daily  Held on admission due to hypotension and acute kidney injury

## 2025-02-24 NOTE — ASSESSMENT & PLAN NOTE
Patient is a 60-year-old with past medical history significant for COPD, hypertension, alcohol and tobacco dependence who presented to the ER with 5-day history of dyspnea and cough and was admitted to the ICU     Initially ICU was placed on BiPAP due to hypoxia and metabolic acidosis  Etiology was attributed to post influenza pneumonia with sepsis, superimposed on COPD exacerbation  Pulmonologist also noted bronchiectasis with acute exacerbation, and recent influenza A infection  Patient was treated with IV antibiotics, home nebulizers with improvement  Current adequate oxygenation on 3 L  Will need home O2 eval prior to discharge

## 2025-02-24 NOTE — ASSESSMENT & PLAN NOTE
Patient with mild, normocytic anemia: Hemoglobin ranging 10-12  unclear baseline: Most recent outpatient hemoglobin from 2017 was 15   no evidence of bleeding  Outpatient follow-up

## 2025-02-24 NOTE — ASSESSMENT & PLAN NOTE
Patient presented with hypovolemic hyponatremia in the setting of acute illness, sepsis, acute kidney injury, HCTZ use  Was given IV fluids

## 2025-02-24 NOTE — ASSESSMENT & PLAN NOTE
Patient presented with acute kidney injury: In the setting of acute illness, sepsis, and diarrhea, plus ACE inhibitor/NSAID  Baseline creatinine 1.0  Patient presented with a creatinine of 7.0, and metabolic acidosis  Was given IV fluids, Tinajero catheter placed, and followed by nephrology  Creatinine markedly improved, Tinajero discontinued

## 2025-02-24 NOTE — PROGRESS NOTES
"Progress Note - Hospitalist   Name: Luis Antonio Quiñones 60 y.o. male I MRN: [id number]  Unit/Bed#: E2 -01 I Date of Admission: 2/21/2025   Date of Service: 2/24/2025 I Hospital Day: 3    Assessment & Plan  Acute respiratory failure with hypoxia (HCC)  Patient is a 60-year-old with past medical history significant for COPD, hypertension, alcohol and tobacco dependence who presented to the ER with 5-day history of dyspnea and cough and was admitted to the ICU     Initially ICU was placed on BiPAP due to hypoxia and metabolic acidosis  Etiology was attributed to post influenza pneumonia with sepsis, superimposed on COPD exacerbation  Pulmonologist also noted bronchiectasis with acute exacerbation, and recent influenza A infection  Patient was treated with IV antibiotics, home nebulizers with improvement  Current adequate oxygenation on 3 L  Will need home O2 eval prior to discharge    Pneumonia  Patient presented with dyspnea, and cough x 5 days with associated respiratory distress and hypoxia  CT chest 2/21/2025: \"Right basilar consolidation and advanced diffuse pulmonary centrilobular and groundglass opacities indicate pneumonia. Atypical pneumonia can have this appearance\"  In the ICU patient was started on azithromycin/ceftriaxone  Positive strep pneumoniae antigen  Procalcitonin 5.04--> 2.20  Continue ceftriaxone day #4  Blood cultures: Negative x 2  COPD exacerbation (HCC)  Patient presented with acute COPD exacerbation secondary to pneumonia  Continue IV steroids, nebulizer treatments  Home regimen includes Advair, Spiriva, Singulair, and albuterol as needed  Taper steroids today  Acute kidney injury (HCC)  Patient presented with acute kidney injury: In the setting of acute illness, sepsis, and diarrhea, plus ACE inhibitor/NSAID  Baseline creatinine 1.0  Patient presented with a creatinine of 7.0, and metabolic acidosis  Was given IV fluids, Tinajero catheter placed, and followed by nephrology  Creatinine markedly " improved, Tinajero discontinued  Benign essential hypertension  Home medications include lisinopril-HCTZ 20/25 mg daily  Held on admission due to hypotension and acute kidney injury  Current mild episode of major depressive disorder (HCC)  Home medications include Abilify, Cymbalta, Remeron  Hyponatremia  Patient presented with hypovolemic hyponatremia in the setting of acute illness, sepsis, acute kidney injury, HCTZ use  Was given IV fluids  Hypercalcemia (Resolved: 2/24/2025)  Noted on admission  Secondary to volume depletion: Since normalized  Sepsis with acute organ dysfunction (HCC)  Patient was admitted to the ICU and met criteria for sepsis, present on admission, with hypotension, tachypnea, and tachycardia: Secondary to pneumonia  Initial lactic acid 2.6  Was given IV fluids, and antibiotics with improvement  Flu A  Presented outside the window for Tamiflu  Provided supportive care    Metabolic acidosis (Resolved: 2/24/2025)  Secondary to acute kidney injury, as described above  Since resolved  Anemia  Patient with mild, normocytic anemia: Hemoglobin ranging 10-12  unclear baseline: Most recent outpatient hemoglobin from 2017 was 15   no evidence of bleeding  Outpatient follow-up  Orthopnea  Likely due to pneumonia  Check ECHO     VTE Pharmacologic Prophylaxis: VTE Score: 7 High Risk (Score >/= 5) - Pharmacological DVT Prophylaxis Ordered: heparin. Sequential Compression Devices Ordered.    Mobility:   Basic Mobility Inpatient Raw Score: 24  JH-HLM Goal: 8: Walk 250 feet or more  JH-HLM Achieved: 7: Walk 25 feet or more  JH-HLM Goal NOT achieved. Continue with multidisciplinary rounding and encourage appropriate mobility to improve upon JH-HLM goals.    Patient Centered Rounds: I performed bedside rounds with nursing staff today.   Discussions with Specialists or Other Care Team Provider: CM    Education and Discussions with Family / Patient: called mother Zoë Darden: and gave update.  Updated pt at  bedside.    Current Length of Stay: 3 day(s)  Current Patient Status: Inpatient   Certification Statement: The patient will continue to require additional inpatient hospital stay due to pneumonia, hypoxia  Discharge Plan: Anticipate discharge in 24-48 hrs to home.    Code Status: Level 1 - Full Code    Subjective   Pt notes he continues to have SERVIN.  Notes cough has improved.  Dry cough.  Minimal phlegm.  Denies any pain.  No chest pain.  No abd pain.  No n/v/d.  Tolerating po with excellent appetite.    Denies any dizziness, lightheadness.      Objective :  Temp:  [97.6 °F (36.4 °C)-98.1 °F (36.7 °C)] 97.6 °F (36.4 °C)  HR:  [] 98  BP: (114-133)/(56-76) 133/76  Resp:  [16-20] 18  SpO2:  [89 %-96 %] 96 %  O2 Device: Nasal cannula  Nasal Cannula O2 Flow Rate (L/min):  [2 L/min-3 L/min] 3 L/min    Body mass index is 25.53 kg/m².     Input and Output Summary (last 24 hours):     Intake/Output Summary (Last 24 hours) at 2/24/2025 1141  Last data filed at 2/23/2025 1700  Gross per 24 hour   Intake 1020 ml   Output --   Net 1020 ml       Physical Exam  Gen: pleasant male.  Nad  Heart; RRR no m/r/g  Lungs: +faint end exp wheezing.  RLL crackles/rhonchi and decreased air movement.  No acessory muscle use/resp distress  Abd: soft, nt/nd, nabs.  No guard/rebound.    Ext: no c/c/e.  2+pedal pulses  Neuro: awake.  Alert.  Fluent speech.  Moving all 4 extremities  Skin: no petechia, purpura, rash    Lines/Drains:              Lab Results: I have reviewed the following results:   Results from last 7 days   Lab Units 02/24/25  0944 02/23/25  0545 02/22/25  0518 02/21/25  0856   WBC Thousand/uL 10.56*   < > 10.17* 8.53   HEMOGLOBIN g/dL 10.8*   < > 11.6* 14.7   HEMATOCRIT % 31.6*   < > 32.0* 41.1   PLATELETS Thousands/uL 316   < > 332 339   BANDS PCT %  --   --  2  --    SEGS PCT %  --   --   --  72   LYMPHO PCT %  --   --  10* 7*   MONO PCT %  --   --  7 18*   EOS PCT %  --   --  0 0    < > = values in this interval not  displayed.     Results from last 7 days   Lab Units 02/24/25  0944 02/23/25  0545 02/22/25  0518   SODIUM mmol/L 136   < > 135   POTASSIUM mmol/L 3.4*   < > 3.0*   CHLORIDE mmol/L 97   < > 94*   CO2 mmol/L 31   < > 27   BUN mg/dL 42*   < > 104*   CREATININE mg/dL 0.91   < > 2.55*   ANION GAP mmol/L 8   < > 14*   CALCIUM mg/dL 9.1   < > 9.5   ALBUMIN g/dL  --   --  3.4*   TOTAL BILIRUBIN mg/dL  --   --  0.68   ALK PHOS U/L  --   --  21*   ALT U/L  --   --  36   AST U/L  --   --  31   GLUCOSE RANDOM mg/dL 195*   < > 145*    < > = values in this interval not displayed.     Results from last 7 days   Lab Units 02/21/25  0855   INR  1.26*             Results from last 7 days   Lab Units 02/22/25  0518 02/21/25  1130 02/21/25  0855   LACTIC ACID mmol/L  --  2.0 2.6*   PROCALCITONIN ng/ml 2.20*  --  5.04*       Recent Cultures (last 7 days):   Results from last 7 days   Lab Units 02/22/25  1040 02/21/25  1826 02/21/25  0907 02/21/25  0855   BLOOD CULTURE   --   --  No Growth at 48 hrs. No Growth at 48 hrs.   SPUTUM CULTURE  Culture results to follow.  --   --   --    GRAM STAIN RESULT  1+ Epithelial cells per low power field  No Polys or Bacteria seen  --   --   --    LEGIONELLA URINARY ANTIGEN   --  Negative  --   --        ==================================================  Imaging  2/21: CT chest/abdomen/pelvis  Right basilar consolidation and advanced diffuse pulmonary centrilobular and groundglass opacities indicate pneumonia. Atypical pneumonia can have this appearance.  No acute findings in the abdomen or pelvis.    2/21: Chest x-ray:  Mild congestion suggested     Microbiology  2/22: Sputum culture: Pending  2/21: Positive strep pneumoniae antigen  2/21: Negative Legionella antigen  2/21: Blood culture: Negative x 2  2/21: Positive influenza A.  Negative COVID/RSV    ================================================    Last 24 Hours Medication List:     Current Facility-Administered Medications:     acetaminophen  (TYLENOL) tablet 650 mg, Q4H PRN    albuterol (PROVENTIL HFA,VENTOLIN HFA) inhaler 2 puff, Q4H PRN    ARIPiprazole (ABILIFY) tablet 5 mg, Daily    benzonatate (TESSALON PERLES) capsule 100 mg, TID    cefTRIAXone (ROCEPHIN) 1,000 mg in dextrose 5 % 50 mL IVPB, Q24H, Last Rate: 1,000 mg (02/24/25 [facility])    dextromethorphan-guaiFENesin (ROBITUSSIN DM) oral syrup 10 mL, Q4H PRN    DULoxetine (CYMBALTA) delayed release capsule 60 mg, Daily    fluticasone-vilanterol 200-25 mcg/actuation 1 puff, Daily    heparin (porcine) subcutaneous injection 5,000 Units, Q8H AMARILIS    levalbuterol (XOPENEX) inhalation solution 1.25 mg, Q6H    melatonin tablet 6 mg, HS PRN    methylPREDNISolone sodium succinate (Solu-MEDROL) injection 20 mg, Q12H AMARILIS    mirtazapine (REMERON) tablet 30 mg, QPM    montelukast (SINGULAIR) tablet 10 mg, Daily    nicotine (NICODERM CQ) 14 mg/24hr TD 24 hr patch 14 mg, Q24H    ondansetron (ZOFRAN) injection 4 mg, Q4H PRN    potassium chloride (Klor-Con M20) CR tablet 40 mEq, Once    umeclidinium 62.5 mcg/actuation inhaler AEPB 1 puff, Daily    Administrative Statements   Today, Patient Was Seen By: Fatimah Pineda MD      **Please Note: This note may have been constructed using a voice recognition system.**

## 2025-02-24 NOTE — ASSESSMENT & PLAN NOTE
Patient presented with acute COPD exacerbation secondary to pneumonia  Continue IV steroids, nebulizer treatments  Home regimen includes Advair, Spiriva, Singulair, and albuterol as needed  Taper steroids today

## 2025-02-24 NOTE — PLAN OF CARE
Problem: PAIN - ADULT  Goal: Verbalizes/displays adequate comfort level or baseline comfort level  Description: Interventions:  - Encourage patient to monitor pain and request assistance  - Assess pain using appropriate pain scale  - Administer analgesics based on type and severity of pain and evaluate response  - Implement non-pharmacological measures as appropriate and evaluate response  - Consider cultural and social influences on pain and pain management  - Notify physician/advanced practitioner if interventions unsuccessful or patient reports new pain  Outcome: Progressing     Problem: INFECTION - ADULT  Goal: Absence or prevention of progression during hospitalization  Description: INTERVENTIONS:  - Assess and monitor for signs and symptoms of infection  - Monitor lab/diagnostic results  - Monitor all insertion sites, i.e. indwelling lines, tubes, and drains  - Monitor endotracheal if appropriate and nasal secretions for changes in amount and color  - [place] appropriate cooling/warming therapies per order  - Administer medications as ordered  - Instruct and encourage patient and family to use good hand hygiene technique  - Identify and instruct in appropriate isolation precautions for identified infection/condition  Outcome: Progressing  Goal: Absence of fever/infection during neutropenic period  Description: INTERVENTIONS:  - Monitor WBC    Outcome: Progressing     Problem: DISCHARGE PLANNING  Goal: Discharge to home or other facility with appropriate resources  Description: INTERVENTIONS:  - Identify barriers to discharge w/patient and caregiver  - Arrange for needed discharge resources and transportation as appropriate  - Identify discharge learning needs (meds, wound care, etc.)  - Arrange for interpretive services to assist at discharge as needed  - Refer to Case Management Department for coordinating discharge planning if the patient needs post-hospital services based on physician/advanced  practitioner order or complex needs related to functional status, cognitive ability, or social support system  Outcome: Progressing     Problem: Knowledge Deficit  Goal: Patient/family/caregiver demonstrates understanding of disease process, treatment plan, medications, and discharge instructions  Description: Complete learning assessment and assess knowledge base.  Interventions:  - Provide teaching at level of understanding  - Provide teaching via preferred learning methods  Outcome: Progressing     Problem: RESPIRATORY - ADULT  Goal: Achieves optimal ventilation and oxygenation  Description: INTERVENTIONS:  - Assess for changes in respiratory status  - Assess for changes in mentation and behavior  - Position to facilitate oxygenation and minimize respiratory effort  - Oxygen administered by appropriate delivery if ordered  - Initiate smoking cessation education as indicated  - Encourage broncho-pulmonary hygiene including cough, deep breathe, Incentive Spirometry  - Assess the need for suctioning and aspirate as needed  - Assess and instruct to report SOB or any respiratory difficulty  - Respiratory Therapy support as indicated  Outcome: Progressing     Problem: Prexisting or High Potential for Compromised Skin Integrity  Goal: Skin integrity is maintained or improved  Description: INTERVENTIONS:  - Identify patients at risk for skin breakdown  - Assess and monitor skin integrity  - Assess and monitor nutrition and hydration status  - Monitor labs   - Assess for incontinence   - Turn and reposition patient  - Assist with mobility/ambulation  - Relieve pressure over bony prominences  - Avoid friction and shearing  - Provide appropriate hygiene as needed including keeping skin clean and dry  - Evaluate need for skin moisturizer/barrier cream  - Collaborate with interdisciplinary team   - Patient/family teaching  - Consider wound care consult   Outcome: Progressing

## 2025-02-24 NOTE — ASSESSMENT & PLAN NOTE
"Patient presented with dyspnea, and cough x 5 days with associated respiratory distress and hypoxia  CT chest 2/21/2025: \"Right basilar consolidation and advanced diffuse pulmonary centrilobular and groundglass opacities indicate pneumonia. Atypical pneumonia can have this appearance\"  In the ICU patient was started on azithromycin/ceftriaxone  Positive strep pneumoniae antigen  Procalcitonin 5.04--> 2.20  Continue ceftriaxone day #4  Blood cultures: Negative x 2  "

## 2025-02-25 ENCOUNTER — APPOINTMENT (INPATIENT)
Dept: NON INVASIVE DIAGNOSTICS | Facility: HOSPITAL | Age: 60
DRG: 720 | End: 2025-02-25
Payer: MEDICARE

## 2025-02-25 PROBLEM — E87.1 HYPONATREMIA: Status: RESOLVED | Noted: 2025-02-21 | Resolved: 2025-02-25

## 2025-02-25 LAB
ANION GAP SERPL CALCULATED.3IONS-SCNC: 6 MMOL/L (ref 4–13)
AORTIC ROOT: 3.1 CM
ASCENDING AORTA: 3.1 CM
BASOPHILS # BLD AUTO: 0.01 THOUSANDS/ÂΜL (ref 0–0.1)
BASOPHILS NFR BLD AUTO: 0 % (ref 0–1)
BSA FOR ECHO PROCEDURE: 1.98 M2
BUN SERPL-MCNC: 35 MG/DL (ref 5–25)
CALCIUM SERPL-MCNC: 9 MG/DL (ref 8.4–10.2)
CHLORIDE SERPL-SCNC: 99 MMOL/L (ref 96–108)
CO2 SERPL-SCNC: 32 MMOL/L (ref 21–32)
CREAT SERPL-MCNC: 0.8 MG/DL (ref 0.6–1.3)
E WAVE DECELERATION TIME: 233 MS
E/A RATIO: 1.12
EOSINOPHIL # BLD AUTO: 0 THOUSAND/ÂΜL (ref 0–0.61)
EOSINOPHIL NFR BLD AUTO: 0 % (ref 0–6)
ERYTHROCYTE [DISTWIDTH] IN BLOOD BY AUTOMATED COUNT: 13.4 % (ref 11.6–15.1)
FRACTIONAL SHORTENING: 29 (ref 28–44)
GFR SERPL CREATININE-BSD FRML MDRD: 98 ML/MIN/1.73SQ M
GLUCOSE SERPL-MCNC: 146 MG/DL (ref 65–140)
HCT VFR BLD AUTO: 31.7 % (ref 36.5–49.3)
HGB BLD-MCNC: 10.8 G/DL (ref 12–17)
IMM GRANULOCYTES # BLD AUTO: >0.5 THOUSAND/UL (ref 0–0.2)
IMM GRANULOCYTES NFR BLD AUTO: 7 % (ref 0–2)
INTERVENTRICULAR SEPTUM IN DIASTOLE (PARASTERNAL SHORT AXIS VIEW): 0.8 CM
INTERVENTRICULAR SEPTUM: 0.8 CM (ref 0.6–1.1)
LAAS-AP2: 14.4 CM2
LAAS-AP4: 13.3 CM2
LEFT ATRIUM SIZE: 4 CM
LEFT ATRIUM VOLUME (MOD BIPLANE): 32 ML
LEFT ATRIUM VOLUME INDEX (MOD BIPLANE): 16.1 ML/M2
LEFT INTERNAL DIMENSION IN SYSTOLE: 3.6 CM (ref 2.1–4)
LEFT VENTRICULAR INTERNAL DIMENSION IN DIASTOLE: 5.1 CM (ref 3.5–6)
LEFT VENTRICULAR POSTERIOR WALL IN END DIASTOLE: 1 CM
LEFT VENTRICULAR STROKE VOLUME: 69 ML
LV EF US.2D.A4C+ESTIMATED: 55 %
LVSV (TEICH): 69 ML
LYMPHOCYTES # BLD AUTO: 1.33 THOUSANDS/ÂΜL (ref 0.6–4.47)
LYMPHOCYTES NFR BLD AUTO: 12 % (ref 14–44)
MCH RBC QN AUTO: 32 PG (ref 26.8–34.3)
MCHC RBC AUTO-ENTMCNC: 34.1 G/DL (ref 31.4–37.4)
MCV RBC AUTO: 94 FL (ref 82–98)
MONOCYTES # BLD AUTO: 0.88 THOUSAND/ÂΜL (ref 0.17–1.22)
MONOCYTES NFR BLD AUTO: 8 % (ref 4–12)
MV E'TISSUE VEL-LAT: 17 CM/S
MV E'TISSUE VEL-SEP: 12 CM/S
MV PEAK A VEL: 0.82 M/S
MV PEAK E VEL: 92 CM/S
MV STENOSIS PRESSURE HALF TIME: 68 MS
MV VALVE AREA P 1/2 METHOD: 3.2
NEUTROPHILS # BLD AUTO: 8.15 THOUSANDS/ÂΜL (ref 1.85–7.62)
NEUTS SEG NFR BLD AUTO: 73 % (ref 43–75)
NRBC BLD AUTO-RTO: 0 /100 WBCS
PLATELET # BLD AUTO: 299 THOUSANDS/UL (ref 149–390)
PMV BLD AUTO: 8.4 FL (ref 8.9–12.7)
POTASSIUM SERPL-SCNC: 4.3 MMOL/L (ref 3.5–5.3)
PROCALCITONIN SERPL-MCNC: 0.12 NG/ML
RBC # BLD AUTO: 3.38 MILLION/UL (ref 3.88–5.62)
RIGHT ATRIUM AREA SYSTOLE A4C: 9.7 CM2
RIGHT VENTRICLE ID DIMENSION: 3.6 CM
SL CV LEFT ATRIUM LENGTH A2C: 4.9 CM
SL CV LV EF: 52
SL CV PED ECHO LEFT VENTRICLE DIASTOLIC VOLUME (MOD BIPLANE) 2D: 125 ML
SL CV PED ECHO LEFT VENTRICLE SYSTOLIC VOLUME (MOD BIPLANE) 2D: 56 ML
SODIUM SERPL-SCNC: 137 MMOL/L (ref 135–147)
TRICUSPID ANNULAR PLANE SYSTOLIC EXCURSION: 2.8 CM
WBC # BLD AUTO: 11.13 THOUSAND/UL (ref 4.31–10.16)

## 2025-02-25 PROCEDURE — 93306 TTE W/DOPPLER COMPLETE: CPT

## 2025-02-25 PROCEDURE — 94640 AIRWAY INHALATION TREATMENT: CPT

## 2025-02-25 PROCEDURE — 93306 TTE W/DOPPLER COMPLETE: CPT | Performed by: INTERNAL MEDICINE

## 2025-02-25 PROCEDURE — 94760 N-INVAS EAR/PLS OXIMETRY 1: CPT

## 2025-02-25 PROCEDURE — 85027 COMPLETE CBC AUTOMATED: CPT | Performed by: INTERNAL MEDICINE

## 2025-02-25 PROCEDURE — 80048 BASIC METABOLIC PNL TOTAL CA: CPT | Performed by: INTERNAL MEDICINE

## 2025-02-25 PROCEDURE — 84145 PROCALCITONIN (PCT): CPT | Performed by: INTERNAL MEDICINE

## 2025-02-25 PROCEDURE — 99232 SBSQ HOSP IP/OBS MODERATE 35: CPT | Performed by: INTERNAL MEDICINE

## 2025-02-25 RX ORDER — PREDNISONE 20 MG/1
40 TABLET ORAL DAILY
Status: DISCONTINUED | OUTPATIENT
Start: 2025-02-26 | End: 2025-02-26 | Stop reason: HOSPADM

## 2025-02-25 RX ADMIN — FLUTICASONE FUROATE AND VILANTEROL TRIFENATATE 1 PUFF: 200; 25 POWDER RESPIRATORY (INHALATION) at 08:28

## 2025-02-25 RX ADMIN — MONTELUKAST 10 MG: 10 TABLET, FILM COATED ORAL at 08:26

## 2025-02-25 RX ADMIN — MIRTAZAPINE 30 MG: 15 TABLET, FILM COATED ORAL at 21:17

## 2025-02-25 RX ADMIN — ARIPIPRAZOLE 5 MG: 10 TABLET ORAL at 08:26

## 2025-02-25 RX ADMIN — DULOXETINE HYDROCHLORIDE 60 MG: 60 CAPSULE, DELAYED RELEASE ORAL at 08:26

## 2025-02-25 RX ADMIN — HEPARIN SODIUM 5000 UNITS: 5000 INJECTION INTRAVENOUS; SUBCUTANEOUS at 14:22

## 2025-02-25 RX ADMIN — BENZONATATE 100 MG: 100 CAPSULE ORAL at 08:26

## 2025-02-25 RX ADMIN — LEVALBUTEROL HYDROCHLORIDE 1.25 MG: 1.25 SOLUTION RESPIRATORY (INHALATION) at 01:26

## 2025-02-25 RX ADMIN — GUAIFENESIN AND DEXTROMETHORPHAN 10 ML: 20; 200 SYRUP ORAL at 05:08

## 2025-02-25 RX ADMIN — BENZONATATE 100 MG: 100 CAPSULE ORAL at 16:18

## 2025-02-25 RX ADMIN — LEVALBUTEROL HYDROCHLORIDE 1.25 MG: 1.25 SOLUTION RESPIRATORY (INHALATION) at 07:16

## 2025-02-25 RX ADMIN — HEPARIN SODIUM 5000 UNITS: 5000 INJECTION INTRAVENOUS; SUBCUTANEOUS at 21:17

## 2025-02-25 RX ADMIN — HEPARIN SODIUM 5000 UNITS: 5000 INJECTION INTRAVENOUS; SUBCUTANEOUS at 05:08

## 2025-02-25 RX ADMIN — LEVALBUTEROL HYDROCHLORIDE 1.25 MG: 1.25 SOLUTION RESPIRATORY (INHALATION) at 13:14

## 2025-02-25 RX ADMIN — GUAIFENESIN AND DEXTROMETHORPHAN 10 ML: 20; 200 SYRUP ORAL at 21:17

## 2025-02-25 RX ADMIN — CEFTRIAXONE 1000 MG: 1 INJECTION, POWDER, FOR SOLUTION INTRAMUSCULAR; INTRAVENOUS at 11:32

## 2025-02-25 RX ADMIN — UMECLIDINIUM 1 PUFF: 62.5 AEROSOL, POWDER ORAL at 08:28

## 2025-02-25 RX ADMIN — BENZONATATE 100 MG: 100 CAPSULE ORAL at 21:17

## 2025-02-25 RX ADMIN — LEVALBUTEROL HYDROCHLORIDE 1.25 MG: 1.25 SOLUTION RESPIRATORY (INHALATION) at 19:34

## 2025-02-25 RX ADMIN — METHYLPREDNISOLONE SODIUM SUCCINATE 20 MG: 40 INJECTION, POWDER, FOR SOLUTION INTRAMUSCULAR; INTRAVENOUS at 08:25

## 2025-02-25 NOTE — ASSESSMENT & PLAN NOTE
"Patient presented with dyspnea, and cough x 5 days with associated respiratory distress and hypoxia  CT chest 2/21/2025: \"Right basilar consolidation and advanced diffuse pulmonary centrilobular and groundglass opacities indicate pneumonia. Atypical pneumonia can have this appearance\"  In the ICU patient was started on azithromycin/ceftriaxone  Positive strep pneumoniae antigen  Procalcitonin 5.04--> 2.20  Continue ceftriaxone day #5  Blood cultures: Negative x 2  "

## 2025-02-25 NOTE — ASSESSMENT & PLAN NOTE
Patient presented with hypovolemic hyponatremia in the setting of acute illness, sepsis, acute kidney injury, HCTZ use  Was given IV fluids, with normalization

## 2025-02-25 NOTE — ASSESSMENT & PLAN NOTE
Home medications include lisinopril-HCTZ 20/25 mg daily  Held on admission due to hypotension and acute kidney injury  Blood pressure adequately controlled: Anticipate starting Norvasc if antibiotic tensive need

## 2025-02-25 NOTE — PLAN OF CARE
Problem: PAIN - ADULT  Goal: Verbalizes/displays adequate comfort level or baseline comfort level  Description: Interventions:  - Encourage patient to monitor pain and request assistance  - Assess pain using appropriate pain scale  - Administer analgesics based on type and severity of pain and evaluate response  - Implement non-pharmacological measures as appropriate and evaluate response  - Consider cultural and social influences on pain and pain management  - Notify physician/advanced practitioner if interventions unsuccessful or patient reports new pain  Outcome: Progressing     Problem: INFECTION - ADULT  Goal: Absence or prevention of progression during hospitalization  Description: INTERVENTIONS:  - Assess and monitor for signs and symptoms of infection  - Monitor lab/diagnostic results  - Monitor all insertion sites, i.e. indwelling lines, tubes, and drains  - Monitor endotracheal if appropriate and nasal secretions for changes in amount and color  - [place] appropriate cooling/warming therapies per order  - Administer medications as ordered  - Instruct and encourage patient and family to use good hand hygiene technique  - Identify and instruct in appropriate isolation precautions for identified infection/condition  Outcome: Progressing  Goal: Absence of fever/infection during neutropenic period  Description: INTERVENTIONS:  - Monitor WBC    Outcome: Progressing       Problem: Knowledge Deficit  Goal: Patient/family/caregiver demonstrates understanding of disease process, treatment plan, medications, and discharge instructions  Description: Complete learning assessment and assess knowledge base.  Interventions:  - Provide teaching at level of understanding  - Provide teaching via preferred learning methods  Outcome: Progressing     Problem: RESPIRATORY - ADULT  Goal: Achieves optimal ventilation and oxygenation  Description: INTERVENTIONS:  - Assess for changes in respiratory status  - Assess for changes  in mentation and behavior  - Position to facilitate oxygenation and minimize respiratory effort  - Oxygen administered by appropriate delivery if ordered  - Initiate smoking cessation education as indicated  - Encourage broncho-pulmonary hygiene including cough, deep breathe, Incentive Spirometry  - Assess the need for suctioning and aspirate as needed  - Assess and instruct to report SOB or any respiratory difficulty  - Respiratory Therapy support as indicated  Outcome: Progressing     Problem: Prexisting or High Potential for Compromised Skin Integrity  Goal: Skin integrity is maintained or improved  Description: INTERVENTIONS:  - Identify patients at risk for skin breakdown  - Assess and monitor skin integrity  - Assess and monitor nutrition and hydration status  - Monitor labs   - Assess for incontinence   - Turn and reposition patient  - Assist with mobility/ambulation  - Relieve pressure over bony prominences  - Avoid friction and shearing  - Provide appropriate hygiene as needed including keeping skin clean and dry  - Evaluate need for skin moisturizer/barrier cream  - Collaborate with interdisciplinary team   - Patient/family teaching  - Consider wound care consult   Outcome: Progressing

## 2025-02-25 NOTE — PROGRESS NOTES
"Progress Note - Hospitalist   Name: Luis Antonio Quiñones 60 y.o. male I MRN: [id number]  Unit/Bed#: E2 -01 I Date of Admission: 2/21/2025   Date of Service: 2/25/2025 I Hospital Day: 4    Assessment & Plan  Acute respiratory failure with hypoxia (HCC)  Patient is a 60-year-old with past medical history significant for COPD, hypertension, alcohol and tobacco dependence who presented to the ER with 5-day history of dyspnea and cough and was admitted to the ICU     Initially ICU was placed on BiPAP due to hypoxia and metabolic acidosis  Etiology was attributed to post influenza pneumonia with sepsis, superimposed on COPD exacerbation  Pulmonologist also noted bronchiectasis with acute exacerbation, and recent influenza A infection  Patient was treated with IV antibiotics, home nebulizers with improvement  Current adequate oxygenation on 1 L: Markedly improved: Previous oxygen requirements have decreased  Will need home O2 eval prior to discharge  Hospital discharge in the next 24 hours if continues to improve    Pneumonia  Patient presented with dyspnea, and cough x 5 days with associated respiratory distress and hypoxia  CT chest 2/21/2025: \"Right basilar consolidation and advanced diffuse pulmonary centrilobular and groundglass opacities indicate pneumonia. Atypical pneumonia can have this appearance\"  In the ICU patient was started on azithromycin/ceftriaxone  Positive strep pneumoniae antigen  Procalcitonin 5.04--> 2.20  Continue ceftriaxone day #5  Blood cultures: Negative x 2  COPD exacerbation (HCC)  Patient presented with acute COPD exacerbation secondary to pneumonia  Continue IV steroids, nebulizer treatments  Home regimen includes Advair, Spiriva, Singulair, and albuterol as needed  Continue to taper steroids: Anticipate transitioning to oral tomorrow  Acute kidney injury (HCC)  Patient presented with acute kidney injury: In the setting of acute illness, sepsis, and diarrhea, plus ACE inhibitor/NSAID  Baseline " creatinine 1.0  Patient presented with a creatinine of 7.0, and metabolic acidosis  Was given IV fluids, Tinajero catheter placed, and followed by nephrology  Creatinine markedly improved, Tinajero discontinued  Benign essential hypertension  Home medications include lisinopril-HCTZ 20/25 mg daily  Held on admission due to hypotension and acute kidney injury  Blood pressure adequately controlled: Anticipate starting Norvasc if antibiotic tensive need  Current mild episode of major depressive disorder (HCC)  Home medications include Abilify, Cymbalta, Remeron  Hyponatremia (Resolved: 2/25/2025)  Patient presented with hypovolemic hyponatremia in the setting of acute illness, sepsis, acute kidney injury, HCTZ use  Was given IV fluids, with normalization  Sepsis with acute organ dysfunction (HCC)  Patient was admitted to the ICU and met criteria for sepsis, present on admission, with hypotension, tachypnea, and tachycardia: Secondary to pneumonia  Initial lactic acid 2.6  Was given IV fluids, and antibiotics with improvement  Flu A  Presented outside the window for Tamiflu  Provided supportive care    Anemia  Patient with mild, normocytic anemia: Hemoglobin ranging 10-12  unclear baseline: Most recent outpatient hemoglobin from 2017 was 15   no evidence of bleeding  Outpatient follow-up  Orthopnea  Likely due to pneumonia  ECHO: Pending    VTE Pharmacologic Prophylaxis: VTE Score: 7 High Risk (Score >/= 5) - Pharmacological DVT Prophylaxis Ordered: heparin. Sequential Compression Devices Ordered.    Mobility:   Basic Mobility Inpatient Raw Score: 24  JH-HLM Goal: 8: Walk 250 feet or more  JH-HLM Achieved: 7: Walk 25 feet or more  JH-HLM Goal NOT achieved. Continue with multidisciplinary rounding and encourage appropriate mobility to improve upon JH-HLM goals.    Patient Centered Rounds: I performed bedside rounds with nursing staff today.   Discussions with Specialists or Other Care Team Provider: CM    Education and  Discussions with Family / Patient: called mother Zoë and gave update    Current Length of Stay: 4 day(s)  Current Patient Status: Inpatient   Certification Statement: The patient will continue to require additional inpatient hospital stay due to pneumonia on iv abx.  Discharge Plan: Anticipate discharge in 24-48 hrs to home.    Code Status: Level 1 - Full Code    Subjective   Pt notes he feels much better.  Notes sob/muro improved.  Notes cough improved.  Denies any pain anywhere.  Denies any abd pain, n/v/d.  Tolerating po.  Denies any dizziness, lightheadness.    Objective :  Temp:  [97.2 °F (36.2 °C)-99.1 °F (37.3 °C)] 98.2 °F (36.8 °C)  HR:  [] 84  BP: (136-161)/(73-98) 142/82  Resp:  [18] 18  SpO2:  [91 %-96 %] 94 %  O2 Device: Nasal cannula  Nasal Cannula O2 Flow Rate (L/min):  [1 L/min-2 L/min] 1 L/min    Body mass index is 25.4 kg/m².     Input and Output Summary (last 24 hours):     Intake/Output Summary (Last 24 hours) at 2/25/2025 1344  Last data filed at 2/25/2025 1226  Gross per 24 hour   Intake 504 ml   Output 200 ml   Net 304 ml       Physical Exam  Gen: pleasant male.  Nad. Nontachypnic  Heart: RRR no m/r/g  Lung: improved air movement.  Very faint end exp wheezing.  No crackles.  Faint scattered rhonchi.  No accessory muscle use or resp distress  Abd: soft, nt/nd, nabs  Ext: no c/c/e.  2+pedal pulses  Skin: warm and dry.  No petechiae/purpura/rash.  Neuro: awake. Alert.  Fluent speech.  Moving all extremities    Lines/Drains:              Lab Results: I have reviewed the following results:   Results from last 7 days   Lab Units 02/25/25  0505 02/24/25  0944   WBC Thousand/uL 11.13* 10.56*   HEMOGLOBIN g/dL 10.8* 10.8*   HEMATOCRIT % 31.7* 31.6*   PLATELETS Thousands/uL 299 316   BANDS PCT %  --  1   SEGS PCT % 73  --    LYMPHO PCT % 12* 6*   MONO PCT % 8 9   EOS PCT % 0 0     Results from last 7 days   Lab Units 02/25/25  0505 02/23/25  0545 02/22/25  0518   SODIUM mmol/L 137   < > 135    POTASSIUM mmol/L 4.3   < > 3.0*   CHLORIDE mmol/L 99   < > 94*   CO2 mmol/L 32   < > 27   BUN mg/dL 35*   < > 104*   CREATININE mg/dL 0.80   < > 2.55*   ANION GAP mmol/L 6   < > 14*   CALCIUM mg/dL 9.0   < > 9.5   ALBUMIN g/dL  --   --  3.4*   TOTAL BILIRUBIN mg/dL  --   --  0.68   ALK PHOS U/L  --   --  21*   ALT U/L  --   --  36   AST U/L  --   --  31   GLUCOSE RANDOM mg/dL 146*   < > 145*    < > = values in this interval not displayed.     Results from last 7 days   Lab Units 02/21/25  0855   INR  1.26*             Results from last 7 days   Lab Units 02/25/25  0505 02/22/25  0518 02/21/25  1130 02/21/25  0855   LACTIC ACID mmol/L  --   --  2.0 2.6*   PROCALCITONIN ng/ml 0.12 2.20*  --  5.04*       Recent Cultures (last 7 days):   Results from last 7 days   Lab Units 02/22/25  1040 02/21/25  1826 02/21/25  0907 02/21/25  0855   BLOOD CULTURE   --   --  No Growth at 72 hrs. No Growth at 72 hrs.   SPUTUM CULTURE  Few Colonies of Streptococcus pneumoniae*  1+ Growth of  --   --   --    GRAM STAIN RESULT  1+ Epithelial cells per low power field  No Polys or Bacteria seen  --   --   --    LEGIONELLA URINARY ANTIGEN   --  Negative  --   --      ==================================================  Imaging  2/21: CT chest/abdomen/pelvis  Right basilar consolidation and advanced diffuse pulmonary centrilobular and groundglass opacities indicate pneumonia. Atypical pneumonia can have this appearance.  No acute findings in the abdomen or pelvis.     2/21: Chest x-ray:  Mild congestion suggested      Microbiology  2/22: Sputum culture: strep pneumoniae  2/21: Positive strep pneumoniae antigen  2/21: Negative Legionella antigen  2/21: Blood culture: Negative x 2  2/21: Positive influenza A.  Negative COVID/RSV     ================================================      Last 24 Hours Medication List:     Current Facility-Administered Medications:     acetaminophen (TYLENOL) tablet 650 mg, Q4H PRN    albuterol (PROVENTIL  HFA,VENTOLIN HFA) inhaler 2 puff, Q4H PRN    ARIPiprazole (ABILIFY) tablet 5 mg, Daily    benzonatate (TESSALON PERLES) capsule 100 mg, TID    cefTRIAXone (ROCEPHIN) 1,000 mg in dextrose 5 % 50 mL IVPB, Q24H, Last Rate: 1,000 mg (02/25/25 1132)    dextromethorphan-guaiFENesin (ROBITUSSIN DM) oral syrup 10 mL, Q4H PRN    DULoxetine (CYMBALTA) delayed release capsule 60 mg, Daily    fluticasone-vilanterol 200-25 mcg/actuation 1 puff, Daily    heparin (porcine) subcutaneous injection 5,000 Units, Q8H AMARILIS    levalbuterol (XOPENEX) inhalation solution 1.25 mg, Q6H    melatonin tablet 6 mg, HS PRN    methylPREDNISolone sodium succinate (Solu-MEDROL) injection 20 mg, Q12H AMARILIS    mirtazapine (REMERON) tablet 30 mg, HS    montelukast (SINGULAIR) tablet 10 mg, Daily    nicotine (NICODERM CQ) 14 mg/24hr TD 24 hr patch 14 mg, Q24H    ondansetron (ZOFRAN) injection 4 mg, Q4H PRN    umeclidinium 62.5 mcg/actuation inhaler AEPB 1 puff, Daily    Administrative Statements   Today, Patient Was Seen By: Fatimah Pineda MD      **Please Note: This note may have been constructed using a voice recognition system.**

## 2025-02-25 NOTE — PLAN OF CARE
Problem: PAIN - ADULT  Goal: Verbalizes/displays adequate comfort level or baseline comfort level  Description: Interventions:  - Encourage patient to monitor pain and request assistance  - Assess pain using appropriate pain scale  - Administer analgesics based on type and severity of pain and evaluate response  - Implement non-pharmacological measures as appropriate and evaluate response  - Consider cultural and social influences on pain and pain management  - Notify physician/advanced practitioner if interventions unsuccessful or patient reports new pain  Outcome: Progressing     Problem: INFECTION - ADULT  Goal: Absence or prevention of progression during hospitalization  Description: INTERVENTIONS:  - Assess and monitor for signs and symptoms of infection  - Monitor lab/diagnostic results  - Monitor all insertion sites, i.e. indwelling lines, tubes, and drains  - Monitor endotracheal if appropriate and nasal secretions for changes in amount and color  - [place] appropriate cooling/warming therapies per order  - Administer medications as ordered  - Instruct and encourage patient and family to use good hand hygiene technique  - Identify and instruct in appropriate isolation precautions for identified infection/condition  Outcome: Progressing  Goal: Absence of fever/infection during neutropenic period  Description: INTERVENTIONS:  - Monitor WBC    Outcome: Progressing     Problem: DISCHARGE PLANNING  Goal: Discharge to home or other facility with appropriate resources  Description: INTERVENTIONS:  - Identify barriers to discharge w/patient and caregiver  - Arrange for needed discharge resources and transportation as appropriate  - Identify discharge learning needs (meds, wound care, etc.)  - Arrange for interpretive services to assist at discharge as needed  - Refer to Case Management Department for coordinating discharge planning if the patient needs post-hospital services based on physician/advanced  practitioner order or complex needs related to functional status, cognitive ability, or social support system  Outcome: Progressing     Problem: Knowledge Deficit  Goal: Patient/family/caregiver demonstrates understanding of disease process, treatment plan, medications, and discharge instructions  Description: Complete learning assessment and assess knowledge base.  Interventions:  - Provide teaching at level of understanding  - Provide teaching via preferred learning methods  Outcome: Progressing     Problem: RESPIRATORY - ADULT  Goal: Achieves optimal ventilation and oxygenation  Description: INTERVENTIONS:  - Assess for changes in respiratory status  - Assess for changes in mentation and behavior  - Position to facilitate oxygenation and minimize respiratory effort  - Oxygen administered by appropriate delivery if ordered  - Initiate smoking cessation education as indicated  - Encourage broncho-pulmonary hygiene including cough, deep breathe, Incentive Spirometry  - Assess the need for suctioning and aspirate as needed  - Assess and instruct to report SOB or any respiratory difficulty  - Respiratory Therapy support as indicated  Outcome: Progressing     Problem: Prexisting or High Potential for Compromised Skin Integrity  Goal: Skin integrity is maintained or improved  Description: INTERVENTIONS:  - Identify patients at risk for skin breakdown  - Assess and monitor skin integrity  - Assess and monitor nutrition and hydration status  - Monitor labs   - Assess for incontinence   - Turn and reposition patient  - Assist with mobility/ambulation  - Relieve pressure over bony prominences  - Avoid friction and shearing  - Provide appropriate hygiene as needed including keeping skin clean and dry  - Evaluate need for skin moisturizer/barrier cream  - Collaborate with interdisciplinary team   - Patient/family teaching  - Consider wound care consult   Outcome: Progressing

## 2025-02-25 NOTE — ASSESSMENT & PLAN NOTE
Patient is a 60-year-old with past medical history significant for COPD, hypertension, alcohol and tobacco dependence who presented to the ER with 5-day history of dyspnea and cough and was admitted to the ICU     Initially ICU was placed on BiPAP due to hypoxia and metabolic acidosis  Etiology was attributed to post influenza pneumonia with sepsis, superimposed on COPD exacerbation  Pulmonologist also noted bronchiectasis with acute exacerbation, and recent influenza A infection  Patient was treated with IV antibiotics, home nebulizers with improvement  Current adequate oxygenation on 1 L: [place] improved: Previous oxygen requirements have decreased  Will need home O2 eval prior to discharge  Hospital discharge in the next 24 hours if continues to improve

## 2025-02-25 NOTE — ASSESSMENT & PLAN NOTE
Patient presented with acute COPD exacerbation secondary to pneumonia  Continue IV steroids, nebulizer treatments  Home regimen includes Advair, Spiriva, Singulair, and albuterol as needed  Continue to taper steroids: Anticipate transitioning to oral tomorrow

## 2025-02-26 VITALS
HEIGHT: 70 IN | DIASTOLIC BLOOD PRESSURE: 82 MMHG | WEIGHT: 177 LBS | HEART RATE: 98 BPM | BODY MASS INDEX: 25.34 KG/M2 | OXYGEN SATURATION: 96 % | RESPIRATION RATE: 18 BRPM | TEMPERATURE: 98 F | SYSTOLIC BLOOD PRESSURE: 141 MMHG

## 2025-02-26 PROBLEM — R65.20 SEPSIS WITH ACUTE ORGAN DYSFUNCTION (HCC): Status: RESOLVED | Noted: 2025-02-21 | Resolved: 2025-02-26

## 2025-02-26 PROBLEM — A41.9 SEPSIS WITH ACUTE ORGAN DYSFUNCTION (HCC): Status: RESOLVED | Noted: 2025-02-21 | Resolved: 2025-02-26

## 2025-02-26 PROBLEM — J96.01 ACUTE RESPIRATORY FAILURE WITH HYPOXIA (HCC): Status: RESOLVED | Noted: 2025-02-21 | Resolved: 2025-02-26

## 2025-02-26 PROBLEM — R06.01 ORTHOPNEA: Status: RESOLVED | Noted: 2025-02-24 | Resolved: 2025-02-26

## 2025-02-26 PROBLEM — N17.9 ACUTE KIDNEY INJURY (HCC): Status: RESOLVED | Noted: 2025-02-21 | Resolved: 2025-02-26

## 2025-02-26 LAB
BACTERIA BLD CULT: NORMAL
BACTERIA BLD CULT: NORMAL

## 2025-02-26 PROCEDURE — 94760 N-INVAS EAR/PLS OXIMETRY 1: CPT

## 2025-02-26 PROCEDURE — 99239 HOSP IP/OBS DSCHRG MGMT >30: CPT | Performed by: INTERNAL MEDICINE

## 2025-02-26 PROCEDURE — 94761 N-INVAS EAR/PLS OXIMETRY MLT: CPT

## 2025-02-26 PROCEDURE — 94640 AIRWAY INHALATION TREATMENT: CPT

## 2025-02-26 RX ORDER — CEFDINIR 300 MG/1
300 CAPSULE ORAL EVERY 12 HOURS SCHEDULED
Qty: 2 CAPSULE | Refills: 0 | Status: SHIPPED | OUTPATIENT
Start: 2025-02-27 | End: 2025-02-28

## 2025-02-26 RX ORDER — LISINOPRIL 20 MG/1
20 TABLET ORAL DAILY
Qty: 30 TABLET | Refills: 0 | Status: SHIPPED | OUTPATIENT
Start: 2025-02-26

## 2025-02-26 RX ORDER — PREDNISONE 10 MG/1
TABLET ORAL
Qty: 30 TABLET | Refills: 0 | Status: SHIPPED | OUTPATIENT
Start: 2025-02-27

## 2025-02-26 RX ORDER — ALBUTEROL SULFATE 0.83 MG/ML
2.5 SOLUTION RESPIRATORY (INHALATION) EVERY 6 HOURS PRN
Qty: 125 ML | Refills: 0 | Status: SHIPPED | OUTPATIENT
Start: 2025-02-26 | End: 2025-03-28

## 2025-02-26 RX ADMIN — HEPARIN SODIUM 5000 UNITS: 5000 INJECTION INTRAVENOUS; SUBCUTANEOUS at 05:23

## 2025-02-26 RX ADMIN — CEFTRIAXONE 1000 MG: 1 INJECTION, POWDER, FOR SOLUTION INTRAMUSCULAR; INTRAVENOUS at 10:41

## 2025-02-26 RX ADMIN — UMECLIDINIUM 1 PUFF: 62.5 AEROSOL, POWDER ORAL at 09:00

## 2025-02-26 RX ADMIN — LEVALBUTEROL HYDROCHLORIDE 1.25 MG: 1.25 SOLUTION RESPIRATORY (INHALATION) at 07:57

## 2025-02-26 RX ADMIN — GUAIFENESIN AND DEXTROMETHORPHAN 10 ML: 20; 200 SYRUP ORAL at 05:23

## 2025-02-26 RX ADMIN — PREDNISONE 40 MG: 20 TABLET ORAL at 09:14

## 2025-02-26 RX ADMIN — DULOXETINE HYDROCHLORIDE 60 MG: 60 CAPSULE, DELAYED RELEASE ORAL at 09:14

## 2025-02-26 RX ADMIN — LEVALBUTEROL HYDROCHLORIDE 1.25 MG: 1.25 SOLUTION RESPIRATORY (INHALATION) at 13:38

## 2025-02-26 RX ADMIN — LEVALBUTEROL HYDROCHLORIDE 1.25 MG: 1.25 SOLUTION RESPIRATORY (INHALATION) at 01:42

## 2025-02-26 RX ADMIN — MONTELUKAST 10 MG: 10 TABLET, FILM COATED ORAL at 09:14

## 2025-02-26 RX ADMIN — ARIPIPRAZOLE 5 MG: 10 TABLET ORAL at 09:14

## 2025-02-26 RX ADMIN — FLUTICASONE FUROATE AND VILANTEROL TRIFENATATE 1 PUFF: 200; 25 POWDER RESPIRATORY (INHALATION) at 09:00

## 2025-02-26 RX ADMIN — BENZONATATE 100 MG: 100 CAPSULE ORAL at 09:14

## 2025-02-26 NOTE — PLAN OF CARE
Problem: PAIN - ADULT  Goal: Verbalizes/displays adequate comfort level or baseline comfort level  Description: Interventions:  - Encourage patient to monitor pain and request assistance  - Assess pain using appropriate pain scale  - Administer analgesics based on type and severity of pain and evaluate response  - Implement non-pharmacological measures as appropriate and evaluate response  - Consider cultural and social influences on pain and pain management  - Notify physician/advanced practitioner if interventions unsuccessful or patient reports new pain  Outcome: Progressing     Problem: INFECTION - ADULT  Goal: Absence or prevention of progression during hospitalization  Description: INTERVENTIONS:  - Assess and monitor for signs and symptoms of infection  - Monitor lab/diagnostic results  - Monitor all insertion sites, i.e. indwelling lines, tubes, and drains  - Monitor endotracheal if appropriate and nasal secretions for changes in amount and color  - [place] appropriate cooling/warming therapies per order  - Administer medications as ordered  - Instruct and encourage patient and family to use good hand hygiene technique  - Identify and instruct in appropriate isolation precautions for identified infection/condition  Outcome: Progressing  Goal: Absence of fever/infection during neutropenic period  Description: INTERVENTIONS:  - Monitor WBC    Outcome: Progressing     Problem: Knowledge Deficit  Goal: Patient/family/caregiver demonstrates understanding of disease process, treatment plan, medications, and discharge instructions  Description: Complete learning assessment and assess knowledge base.  Interventions:  - Provide teaching at level of understanding  - Provide teaching via preferred learning methods  Outcome: Progressing     Problem: RESPIRATORY - ADULT  Goal: Achieves optimal ventilation and oxygenation  Description: INTERVENTIONS:  - Assess for changes in respiratory status  - Assess for changes in  mentation and behavior  - Position to facilitate oxygenation and minimize respiratory effort  - Oxygen administered by appropriate delivery if ordered  - Initiate smoking cessation education as indicated  - Encourage broncho-pulmonary hygiene including cough, deep breathe, Incentive Spirometry  - Assess the need for suctioning and aspirate as needed  - Assess and instruct to report SOB or any respiratory difficulty  - Respiratory Therapy support as indicated  Outcome: Progressing     Problem: Prexisting or High Potential for Compromised Skin Integrity  Goal: Skin integrity is maintained or improved  Description: INTERVENTIONS:  - Identify patients at risk for skin breakdown  - Assess and monitor skin integrity  - Assess and monitor nutrition and hydration status  - Monitor labs   - Assess for incontinence   - Turn and reposition patient  - Assist with mobility/ambulation  - Relieve pressure over bony prominences  - Avoid friction and shearing  - Provide appropriate hygiene as needed including keeping skin clean and dry  - Evaluate need for skin moisturizer/barrier cream  - Collaborate with interdisciplinary team   - Patient/family teaching  - Consider wound care consult   Outcome: Progressing

## 2025-02-26 NOTE — ASSESSMENT & PLAN NOTE
Patient presented with acute kidney injury: In the setting of acute illness, sepsis, and diarrhea, plus ACE inhibitor/NSAID  Baseline creatinine 1.0  Patient presented with a creatinine of 7.0, and metabolic acidosis  Was given IV fluids, Tinajero catheter placed, and followed by nephrology  Creatinine markedly improved, Tinajero discontinued  Creatinine returned to baseline prior to discharge

## 2025-02-26 NOTE — RESTORATIVE TECHNICIAN NOTE
Restorative Technician Note      Patient Name: Luis Antonio Quiñones     Restorative Tech Visit Date: 02/26/25  Note Type: Mobility  Patient Position Upon Consult: Supine  Activity Performed: Ambulated  Patient Position at End of Consult: All needs within reach; Bedside chair

## 2025-02-26 NOTE — ASSESSMENT & PLAN NOTE
Home medications include lisinopril-HCTZ 20/25 mg daily  Held on admission due to hypotension and acute kidney injury  Blood pressure adequately controlled, but not at goal  At discharge we will restart lisinopril component of patient's lisinopril/HCTZ 20 mg daily  Follow-up with PCP in 1 week for blood pressure check

## 2025-02-26 NOTE — ASSESSMENT & PLAN NOTE
Patient with mild, normocytic anemia: Hemoglobin ranging 10-12  unclear baseline: Most recent outpatient hemoglobin from 2017 was 15   no evidence of bleeding  Outpatient follow-up with PCP

## 2025-02-26 NOTE — DISCHARGE INSTR - AVS FIRST PAGE
Dear Luis Antonio Quiñones,     It was our pleasure to care for you here at [facility].  You were hospitalized due to pneumonia and copd exacerbation.  You were cared for on the 2nd floor by Fatimah Pineda MD with the [facility] who covers for your primary care physician (PCP), Adams Villela MD, while you were hospitalized.  If you have any questions or concerns related to this hospitalization, you may contact us at .     You were also seen by the pulmonologist Dr. Sanchez    For follow up as well as any medication refills, we recommend that you follow up with your primary care physician.  A registered nurse will reach out to you by phone within a few days after your discharge to answer any additional questions that you may have after going home.  However, at this time we provide for you here, the most important instructions / recommendations at discharge:     Notable Medication Adjustments -   Prednisone taper: 40 mg once a day x 3 days, then 30 mg once a day x 3 days, then 20 mg once a day x 3 days, then 10 mg once a day x 3 days and then stop    Your home blood pressure pill: Lisinopril-HCTZ was discontinued as it has a diuretic component that was affecting your sodium levels and kidneys.  You have been prescribed just the lisinopril portion a new prescription for lisinopril 20 mg tablet has been sent to your pharmacy.    See your family doctor in 1 week for blood pressure check    Please also continue your home inhalers and nebulizers.    Tomorrow is your last day of antibiotics.  You have completed all of your antibiotic doses for today      Important follow up information -   Schedule an appointment to see the pulmonologist in the next 2-3 weeks in the office  Other Instructions -   Return to the ER with any problems at all, specially any chest pain, difficulty breathing, dizziness, weakness, lightheadedness, or any other problems at all  Please  continue over-the-counter cough medicine as needed.  This cough will likely persist for the next few weeks as your pneumonia continues to improve.    Please review this entire after visit summary as additional general instructions including medication list, appointments, activity, diet, any pertinent wound care, and other additional recommendations from your care team that may be provided for you.      Sincerely,     Fatimah Pineda MD   [Negative] : Allergic/Immunologic

## 2025-02-26 NOTE — ASSESSMENT & PLAN NOTE
Patient is a 60-year-old with past medical history significant for COPD, hypertension, alcohol and tobacco dependence who presented to the ER with 5-day history of dyspnea and cough and was admitted to the ICU     Initially ICU was placed on BiPAP due to hypoxia and metabolic acidosis  Etiology was attributed to post influenza pneumonia with sepsis, superimposed on COPD exacerbation  Pulmonologist also noted bronchiectasis with acute exacerbation, and recent influenza A infection  Patient was treated with IV antibiotics, home nebulizers with improvement  Patient had home O2 study performed today and will not require oxygen at discharge

## 2025-02-26 NOTE — PLAN OF CARE
Problem: PAIN - ADULT  Goal: Verbalizes/displays adequate comfort level or baseline comfort level  Description: Interventions:  - Encourage patient to monitor pain and request assistance  - Assess pain using appropriate pain scale  - Administer analgesics based on type and severity of pain and evaluate response  - Implement non-pharmacological measures as appropriate and evaluate response  - Consider cultural and social influences on pain and pain management  - Notify physician/advanced practitioner if interventions unsuccessful or patient reports new pain  Outcome: Progressing     Problem: INFECTION - ADULT  Goal: Absence or prevention of progression during hospitalization  Description: INTERVENTIONS:  - Assess and monitor for signs and symptoms of infection  - Monitor lab/diagnostic results  - Monitor all insertion sites, i.e. indwelling lines, tubes, and drains  - Monitor endotracheal if appropriate and nasal secretions for changes in amount and color  - [place] appropriate cooling/warming therapies per order  - Administer medications as ordered  - Instruct and encourage patient and family to use good hand hygiene technique  - Identify and instruct in appropriate isolation precautions for identified infection/condition  Outcome: Progressing  Goal: Absence of fever/infection during neutropenic period  Description: INTERVENTIONS:  - Monitor WBC    Outcome: Progressing     Problem: DISCHARGE PLANNING  Goal: Discharge to home or other facility with appropriate resources  Description: INTERVENTIONS:  - Identify barriers to discharge w/patient and caregiver  - Arrange for needed discharge resources and transportation as appropriate  - Identify discharge learning needs (meds, wound care, etc.)  - Arrange for interpretive services to assist at discharge as needed  - Refer to Case Management Department for coordinating discharge planning if the patient needs post-hospital services based on physician/advanced  practitioner order or complex needs related to functional status, cognitive ability, or social support system  Outcome: Progressing     Problem: Knowledge Deficit  Goal: Patient/family/caregiver demonstrates understanding of disease process, treatment plan, medications, and discharge instructions  Description: Complete learning assessment and assess knowledge base.  Interventions:  - Provide teaching at level of understanding  - Provide teaching via preferred learning methods  Outcome: Progressing     Problem: RESPIRATORY - ADULT  Goal: Achieves optimal ventilation and oxygenation  Description: INTERVENTIONS:  - Assess for changes in respiratory status  - Assess for changes in mentation and behavior  - Position to facilitate oxygenation and minimize respiratory effort  - Oxygen administered by appropriate delivery if ordered  - Initiate smoking cessation education as indicated  - Encourage broncho-pulmonary hygiene including cough, deep breathe, Incentive Spirometry  - Assess the need for suctioning and aspirate as needed  - Assess and instruct to report SOB or any respiratory difficulty  - Respiratory Therapy support as indicated  Outcome: Progressing     Problem: Prexisting or High Potential for Compromised Skin Integrity  Goal: Skin integrity is maintained or improved  Description: INTERVENTIONS:  - Identify patients at risk for skin breakdown  - Assess and monitor skin integrity  - Assess and monitor nutrition and hydration status  - Monitor labs   - Assess for incontinence   - Turn and reposition patient  - Assist with mobility/ambulation  - Relieve pressure over bony prominences  - Avoid friction and shearing  - Provide appropriate hygiene as needed including keeping skin clean and dry  - Evaluate need for skin moisturizer/barrier cream  - Collaborate with interdisciplinary team   - Patient/family teaching  - Consider wound care consult   Outcome: Progressing

## 2025-02-26 NOTE — ASSESSMENT & PLAN NOTE
Home medications include Abilify, Cymbalta, Remeron and continue medications at discharge and follow-up with PCP

## 2025-02-26 NOTE — ASSESSMENT & PLAN NOTE
"Patient presented with dyspnea, and cough x 5 days with associated respiratory distress and hypoxia  CT chest 2/21/2025: \"Right basilar consolidation and advanced diffuse pulmonary centrilobular and groundglass opacities indicate pneumonia. Atypical pneumonia can have this appearance\"  In the ICU patient was started on azithromycin/ceftriaxone  Positive strep pneumoniae antigen  Procalcitonin 5.04--> 2.20  Continue ceftriaxone day #6/7: Will be discharged on oral Omnicef to complete his course  Blood cultures: Negative x 2  Will continue antitussives as needed  "

## 2025-02-26 NOTE — RESPIRATORY THERAPY NOTE
Home Oxygen Qualifying Test     Patient name: Luis Antonio Quiñones        : 1965   Date of Test:  2025  Diagnosis:    Home Oxygen Test:    **Medicare Guidelines require item(s) 1-5 on all ambulatory patients or 1 and 2 on non-ambulatory patients.    1. Baseline SPO2 on Room Air at rest 92 %   If <= 88% on Room Air add O2 via NC to obtain SpO2 >=88%. If LPM needed, document LPM N/A needed to reach =>88%    SPO2 during exertion on Room Air 91  %  During exertion monitor SPO2. If SPO2 increases >=89%, do not add supplemental oxygen    SPO2 on Oxygen at Rest 98 % at 1 LPM    SPO2 during exertion on Oxygen N/A % at N/A LPM    Test performed during exertion activity.      []  Supplemental Home Oxygen is indicated.    [x]  Client does not qualify for home oxygen.    Respiratory Additional Notes- WALK UNASSISTED X 6 MINUTES ON ROOM AIR LOWEST SAT = 91% NSG, PT, AND CM AWARE     Rodrigo Stein, RT

## 2025-02-26 NOTE — ASSESSMENT & PLAN NOTE
2D echocardiogram: Left ventricular ejection fraction 52%.  Normal diastolic function.  Normal wall motion.  No significant valvular heart disease  Orthopnea likely due to pneumonia: No evidence of CHF, and since improved

## 2025-02-26 NOTE — ASSESSMENT & PLAN NOTE
Patient presented with acute COPD exacerbation secondary to pneumonia  Was placed on IV steroids, nebulizer treatments with improvement  Home regimen includes Advair, Spiriva, Singulair, and albuterol as needed  Patiently discharged home on a steroid taper  Will follow-up with pulmonology in the office for checkup as well as updated PFTs

## 2025-02-26 NOTE — DISCHARGE SUMMARY
"Discharge Summary - Hospitalist   Name: Luis Antonio Quiñones 60 y.o. male I MRN: [id number]  Unit/Bed#: E2 -01 I Date of Admission: 2/21/2025   Date of Service: 2/26/2025 I Hospital Day: 5       Discharging Physician / Practitioner: Fatimah Pineda MD  PCP: Adams Villela MD  Admission Date:   Admission Orders (From admission, onward)       Ordered        02/21/25 1213  INPATIENT ADMISSION  Once                          Discharge Date: 02/26/25  Assessment & Plan  Acute respiratory failure with hypoxia (HCC) (Resolved: 2/26/2025)  Patient is a 60-year-old with past medical history significant for COPD, hypertension, alcohol and tobacco dependence who presented to the ER with 5-day history of dyspnea and cough and was admitted to the ICU     Initially ICU was placed on BiPAP due to hypoxia and metabolic acidosis  Etiology was attributed to post influenza pneumonia with sepsis, superimposed on COPD exacerbation  Pulmonologist also noted bronchiectasis with acute exacerbation, and recent influenza A infection  Patient was treated with IV antibiotics, home nebulizers with improvement  Patient had home O2 study performed today and will not require oxygen at discharge    Pneumonia  Patient presented with dyspnea, and cough x 5 days with associated respiratory distress and hypoxia  CT chest 2/21/2025: \"Right basilar consolidation and advanced diffuse pulmonary centrilobular and groundglass opacities indicate pneumonia. Atypical pneumonia can have this appearance\"  In the ICU patient was started on azithromycin/ceftriaxone  Positive strep pneumoniae antigen  Procalcitonin 5.04--> 2.20  Continue ceftriaxone day #6/7: Will be discharged on oral Omnicef to complete his course  Blood cultures: Negative x 2  Will continue antitussives as needed  COPD exacerbation (HCC)  Patient presented with acute COPD exacerbation secondary to pneumonia  Was placed on IV steroids, nebulizer treatments with improvement  Home regimen includes " Advair, Spiriva, Singulair, and albuterol as needed  Patiently discharged home on a steroid taper  Will follow-up with pulmonology in the office for checkup as well as updated PFTs  Acute kidney injury (HCC) (Resolved: 2/26/2025)  Patient presented with acute kidney injury: In the setting of acute illness, sepsis, and diarrhea, plus ACE inhibitor/NSAID  Baseline creatinine 1.0  Patient presented with a creatinine of 7.0, and metabolic acidosis  Was given IV fluids, Tinajero catheter placed, and followed by nephrology  Creatinine markedly improved, Tinajero discontinued  Creatinine returned to baseline prior to discharge  Benign essential hypertension  Home medications include lisinopril-HCTZ 20/25 mg daily  Held on admission due to hypotension and acute kidney injury  Blood pressure adequately controlled, but not at goal  At discharge we will restart lisinopril component of patient's lisinopril/HCTZ 20 mg daily  Follow-up with PCP in 1 week for blood pressure check  Current mild episode of major depressive disorder (HCC)  Home medications include Abilify, Cymbalta, Remeron and continue medications at discharge and follow-up with PCP  Sepsis with acute organ dysfunction (HCC) (Resolved: 2/26/2025)  Patient was admitted to the ICU and met criteria for sepsis, present on admission, with hypotension, tachypnea, and tachycardia: Secondary to pneumonia  Initial lactic acid 2.6  Was given IV fluids, and antibiotics with improvement  Flu A  Presented outside the window for Tamiflu  Provided supportive care    Anemia  Patient with mild, normocytic anemia: Hemoglobin ranging 10-12  unclear baseline: Most recent outpatient hemoglobin from 2017 was 15   no evidence of bleeding  Outpatient follow-up with PCP  Orthopnea (Resolved: 2/26/2025)  2D echocardiogram: Left ventricular ejection fraction 52%.  Normal diastolic function.  Normal wall motion.  No significant valvular heart disease  Orthopnea likely due to pneumonia: No evidence  of CHF, and since improved    Medical Problems       Resolved Problems  Never Reviewed          Resolved    Hyponatremia 2/25/2025     Resolved by  Fatimah Pineda MD    Hypercalcemia 2/24/2025     Resolved by  Fatimah Pineda MD    Metabolic acidosis 2/24/2025     Resolved by  Fatimah Pineda MD            Consultations During Hospital Stay:  Nephrology: dr schultz    Procedures Performed:   none    Significant Findings / Test Results:   Imaging  2/21: CT chest/abdomen/pelvis  Right basilar consolidation and advanced diffuse pulmonary centrilobular and groundglass opacities indicate pneumonia. Atypical pneumonia can have this appearance.  No acute findings in the abdomen or pelvis.     2/21: Chest x-ray:  Mild congestion suggested      Microbiology  2/22: Sputum culture: strep pneumoniae  2/21: Positive strep pneumoniae antigen  2/21: Negative Legionella antigen  2/21: Blood culture: Negative x 2  2/21: Positive influenza A.  Negative COVID/RSV    Incidental Findings:   none    Test Results Pending at Discharge (will require follow up):   none     Outpatient Tests Requested:  Fu with PCP     Complications:  none    Reason for Admission: sob/cough    Hospital Course:   Luis Antonio Quiñones is a 60 y.o. male patient who originally presented to the hospital on 2/21/2025 due to dyspnea, respiratory distress, hypoxia, and cough.  He was diagnosed with pneumonia and treated with antibiotics under the guidance of the pulmonology service.  Patient clinically improved and was discharged home to continue outpatient follow-up with PCP, and pulmonology    Please see above list of diagnoses and related plan for additional information.     Condition at Discharge: good    Discharge Day Visit / Exam:   Subjective: Patient notes he feels better.  Notes that shortness of breath has improved.  Continues to have a cough and is expectorating any phlegm.  No dyspnea on exertion.  He denies any pain anywhere at all.  Denies any chest pain.  No  "back pain.  No nausea, vomiting, diarrhea.  Is tolerating p.o.  Is ambulating without any dizziness or lightheadedness.  Patient confirms he has a nebulizer machine at home but needs the nebulizer solution.  Confirms he has an albuterol rescue inhaler at home  Vitals: Blood Pressure: 141/82 (02/26/25 0742)  Pulse: 98 (02/26/25 0742)  Temperature: 98 °F (36.7 °C) (02/26/25 0742)  Temp Source: Temporal (02/26/25 0742)  Respirations: 18 (02/26/25 0742)  Height: 5' 10\" (177.8 cm) (02/25/25 1036)  Weight - Scale: 80.3 kg (177 lb) (02/25/25 1036)  SpO2: 96 % (02/26/25 1339)  Physical Exam   General: Very pleasant male.  No acute distress.  Nontachypneic and nondyspneic.  Able to speak in complete sentences without a stop for dyspnea  Heart: Regular rate and rhythm.  S1-S2 present.  No murmur, rub, gallop  Lungs: Coarse breath sounds bilaterally with scattered rhonchi.  No end expiratory wheezing.  No crackles.  No accessory muscle use or respiratory distress.  Improved air movement from previous  Abdomen: Soft, nontender with palpation.  Nondistended.  Normal active bowel sounds present.  No guarding or rebound.  No peritoneal signs or mass  Neurologic: Awake and alert.  Fluent speech.  Interactive.  Moving all 4 extremities.  Cooperates with exam  Skin: Warm and dry.  No petechia, purpura, rash    Discussion with Family: called mother Zoë and gave update    Discharge instructions/Information to patient and family:   See after visit summary for information provided to patient and family.      Provisions for Follow-Up Care:  See after visit summary for information related to follow-up care and any pertinent home health orders.      Mobility at time of Discharge:   Basic Mobility Inpatient Raw Score: 24  JH-HLM Goal: 8: Walk 250 feet or more  JH-HLM Achieved: 3: Sit at edge of bed  HLM Goal NOT achieved. Continue to encourage mobility in post discharge setting. Pt also walked with RT.     Disposition:   Home    Planned " Readmission: none    Discharge Medications:  See after visit summary for reconciled discharge medications provided to patient and/or family.      Administrative Statements   Discharge Statement:  I have spent a total time of 50 minutes in caring for this patient on the day of the visit/encounter.     **Please Note: This note may have been constructed using a voice recognition system**

## 2025-02-27 LAB
BACTERIA SPT RESP CULT: ABNORMAL
BACTERIA SPT RESP CULT: ABNORMAL
GRAM STN SPEC: ABNORMAL
GRAM STN SPEC: ABNORMAL

## 2025-02-27 NOTE — UTILIZATION REVIEW
NOTIFICATION OF ADMISSION DISCHARGE   This is a Notification of Discharge from [facility]. Please be advised that this patient has been discharge from our facility. Below you will find the admission and discharge date and time including the patient’s disposition.   UTILIZATION REVIEW CONTACT:  Dianne Durham MA  Utilization   Network Utilization Review Department  Phone: [phone] x carefully listen to the prompts. All voicemails are confidential.  Email: [email or web address]     ADMISSION INFORMATION  PRESENTATION DATE: 2/21/2025  8:44 AM  OBERVATION ADMISSION DATE: N/A  INPATIENT ADMISSION DATE: 2/21/25 12:13 PM   DISCHARGE DATE: 2/26/2025  3:55 PM   DISPOSITION:Home/Self Care    Network Utilization Review Department  ATTENTION: Please call with any questions or concerns to [phone] and carefully listen to the prompts so that you are directed to the right person. All voicemails are confidential.   For Discharge needs, contact Care Management DC Support Team at [phone] opt. 2  Send all requests for admission clinical reviews, approved or denied determinations and any other requests to dedicated fax number below belonging to the campus where the patient is receiving treatment. List of dedicated fax numbers for the Facilities:  FACILITY NAME UR FAX NUMBER   ADMISSION DENIALS (Administrative/Medical Necessity) [phone]   DISCHARGE SUPPORT TEAM ([facility]) [phone]   PARENT CHILD HEALTH (Maternity/NICU/Pediatrics) [phone]   [facility] [phone]   [facility] [phone]   [facility] [phone]   [facility] [phone]   [facility] [phone]   [facility] [phone]   [facility] [phone]   [facility]  [phone]   [facility] [phone]   formerly [facility] [phone]   [facility] [phone]   [facility] [phone]